# Patient Record
Sex: FEMALE | Race: WHITE | NOT HISPANIC OR LATINO | Employment: OTHER | URBAN - METROPOLITAN AREA
[De-identification: names, ages, dates, MRNs, and addresses within clinical notes are randomized per-mention and may not be internally consistent; named-entity substitution may affect disease eponyms.]

---

## 2017-07-18 ENCOUNTER — OFFICE VISIT (OUTPATIENT)
Dept: FAMILY MEDICINE | Facility: CLINIC | Age: 77
End: 2017-07-18

## 2017-07-18 VITALS
WEIGHT: 139 LBS | HEIGHT: 66 IN | HEART RATE: 70 BPM | DIASTOLIC BLOOD PRESSURE: 72 MMHG | RESPIRATION RATE: 16 BRPM | TEMPERATURE: 98.3 F | BODY MASS INDEX: 22.34 KG/M2 | OXYGEN SATURATION: 97 % | SYSTOLIC BLOOD PRESSURE: 119 MMHG

## 2017-07-18 DIAGNOSIS — Z01.818 PREOPERATIVE EXAMINATION: Primary | ICD-10-CM

## 2017-07-18 DIAGNOSIS — N18.30 CHRONIC KIDNEY DISEASE, STAGE III (MODERATE) (H): ICD-10-CM

## 2017-07-18 DIAGNOSIS — G47.30 SLEEP APNEA, UNSPECIFIED TYPE: ICD-10-CM

## 2017-07-18 LAB
ALBUMIN SERPL-MCNC: 4.2 G/DL (ref 3.4–5)
ALP SERPL-CCNC: 90 U/L (ref 40–150)
ALT SERPL W P-5'-P-CCNC: 26 U/L (ref 0–50)
ANION GAP SERPL CALCULATED.3IONS-SCNC: 6 MMOL/L (ref 3–14)
AST SERPL W P-5'-P-CCNC: 15 U/L (ref 0–45)
BILIRUB SERPL-MCNC: 0.6 MG/DL (ref 0.2–1.3)
BUN SERPL-MCNC: 21 MG/DL (ref 7–30)
CALCIUM SERPL-MCNC: 10.1 MG/DL (ref 8.5–10.1)
CHLORIDE SERPL-SCNC: 110 MMOL/L (ref 94–109)
CO2 SERPL-SCNC: 26 MMOL/L (ref 20–32)
CREAT SERPL-MCNC: 1.23 MG/DL (ref 0.52–1.04)
GFR SERPL CREATININE-BSD FRML MDRD: 43 ML/MIN/1.7M2
GLUCOSE SERPL-MCNC: 98 MG/DL (ref 70–99)
POTASSIUM SERPL-SCNC: 5 MMOL/L (ref 3.4–5.3)
PROT SERPL-MCNC: 7.4 G/DL (ref 6.8–8.8)
SODIUM SERPL-SCNC: 142 MMOL/L (ref 133–144)

## 2017-07-18 RX ORDER — IBUPROFEN 200 MG
1 CAPSULE ORAL 2 TIMES DAILY
Qty: 60 TABLET | COMMUNITY
Start: 2017-07-18

## 2017-07-18 ASSESSMENT — PAIN SCALES - GENERAL: PAINLEVEL: NO PAIN (0)

## 2017-07-18 NOTE — NURSING NOTE
"    Chief Complaint   Patient presents with     Pre-Op Exam     noes     77 year old      Blood pressure 119/72, pulse 70, temperature 98.3  F (36.8  C), temperature source Oral, resp. rate 16, height 5' 5.55\" (166.5 cm), weight 139 lb (63 kg), last menstrual period 01/01/1985, SpO2 97 %. Body mass index is 22.74 kg/(m^2).    Wt Readings from Last 2 Encounters:   07/18/17 139 lb (63 kg)   10/09/15 148 lb (67.1 kg)     BP Readings from Last 3 Encounters:   07/18/17 119/72   10/09/15 127/72   11/05/14 146/83       Patient Active Problem List   Diagnosis     History of pulmonary embolism     Concussion     Dysthymia     History of colonic polyps     Lymphedema     Migraine     Pancreatic abnormality     Sleep apnea     Esophageal reflux       Current Outpatient Prescriptions   Medication Sig Dispense Refill     order for DME Equipment being ordered: Oxygen                                            Portable oxygen tank                                            Deliver 2-3 liters via nasal cannula 1 Units prn     Calcium Carbonate-Vitamin D (CALTRATE 600+D PO)        traZODone (DESYREL) 50 MG tablet Take by mouth At Bedtime       venlafaxine (EFFEXOR-ER) 150 MG TB24 Take 150 mg by mouth daily (with breakfast)       simvastatin (ZOCOR) 20 MG tablet Take 1 tablet (20 mg) by mouth At Bedtime 90 tablet 3       Social History   Substance Use Topics     Smoking status: Former Smoker     Quit date: 1/1/1982     Smokeless tobacco: Not on file     Alcohol use No         Health Maintenance Due   Topic Date Due     ADVANCE DIRECTIVE PLANNING Q5 YRS  03/05/1960     LIPID SCREEN Q5 YR FEMALE (SYSTEM ASSIGNED)  03/05/1987     COLON CANCER SCREEN (SYSTEM ASSIGNED)  03/05/1992     FALL RISK ASSESSMENT  03/05/2007     DEXA SCAN SCREENING (SYSTEM ASSIGNED)  03/05/2007       ERUM OATES CMA  July 18, 2017 11:14 AM    "

## 2017-07-18 NOTE — PROGRESS NOTES
[facility]  [address] [place] [address], [address]  [facility] [address]  Phone: [phone]  Fax: [phone]    PRE-OP EVALUATION:    Today's date: 2017    Aretha Kiran (: 1940) presents for pre-operative evaluation assessment as requested by Dr. Ron Qiu III.  She requires evaluation and anesthesia risk assessment prior to undergoing surgery/procedure for treatment of injury to her nose after facial injury from 2016, when she fell down some stairs. Proposed procedure: Reconstruction of her nose, Septoplasty and scar revision, costal cartilage harvest.     Date of Surgery/ Procedure: 17  Time of Surgery/ Procedure: unknown  Hospital/Surgical Facility: [facility]  Fax number for surgical facility: [phone]  Primary Physician: Shelby Moreau MD  Internal Medicine/Pediatrics  Type of Anesthesia Anticipated: General  History of anesthesia complications: NONE  History of  abnormal bleeding: NONE   History of blood transfusions: NO  Patient has a Health Care Directive or Living Will:  NO    PREOP QUESTIONNAIRE  ====================  1- NO - Do you ever have any pain or discomfort in your chest?  2- NO - Have you ever had a severe pain across the front of your chest lasting for half an hour or more?  3- YES- Do you have swelling in your feet or ankles at times?  both legs, chronic, no acute changes, no orthopena/PND  4- NO - Are you troubled by shortness of breath when: walking on the level/ up a slight hill/ at night?  5- NO - Does your chest ever sound wheezy or whistling?  6- NO - Do you currently have a cold, bronchitis or other respiratory infection?  7- NO - Have you had a cold, bronchitis or other respiratory infection within the last 2 weeks?  8- NO - Do you usually have a cough?  9- NO - Do you sometimes get pains in the calves of your legs when you walk?  10-YES - Do you or anyone in your family have previous history of blood clots? Hx of blood clot while on  OCP  11-NO - Do you or does anyone in your family have serious bleeding problem such as prolonged bleeding following surgeries or cuts?  12-NO - Have you ever had problems with anemia or been told to take iron pills?  13-NO - Have you had any abnormal blood loss such as black, tarry or bloody stools, or abnormal vaginal bleeding?  14-NO - Have you or any of your relatives ever had problems with anesthesia?  15-YES Do you snore or stop breathing at night? Hx of Sleep apnea, uses CPAP  16-NO - Do you have any prosthetic heart valves or joints?  17-NO - Is there any chance that you may be pregnant?    HPI:    Isabell Franco is a 78 yo woman who suffered a severe fall down stairs on 4/19/16, resulting in multiple facial fractures and nasal injury. She had surgery to repair an orbital fracture 5/2016. She had subsequent strabismus surgery and reports her vision is now stable. Per patient report she suffered a cervical fracture (C6) , concussion and nasal injury. She reports she is to undergo reconstructive surgery on her nose with grafting of a rib in August.  She did not bring in paperwork from [place] regarding details of the surgery but will have information faxed to us. She denies loss of smell or taste.     Joan has no history of heart dx,lung dx, hypertension or DM. Nonsmoker since 1982. No family history of bleeding or clotting disorders. She reports she has a hx of pulmonary embolism in 2014 when she was on HRT and was in a sedentary state. She was anticoagulated x 3 months and has not had subsequent episode. No personal or family history of intolerance to anesthesia. She has not had any previous complications with surgeries.     Sleep apnea  She uses a CPAP machine and recently got a new facial mask that will not place any pressure on her nose. I have recommended she bring her CPAP machine to the surgery. She denies current cough, shortness or breath or wheezing.       Patient Active Problem List    Diagnosis Date  Noted     Esophageal reflux 12/06/2014     Priority: Medium     Dysthymia 12/01/2014     Priority: Medium     History of colonic polyps 12/01/2014     Priority: Medium     Last colonoscopy with removal of 3 polyps. Due 5/2015 for next endoscopy. Last one done at [facility].            Lymphedema 12/01/2014     Priority: Medium     Followed at [facility].  Wears support hose, uses massage.        Migraine 12/01/2014     Priority: Medium     Follows with Dr. Katie Nathan.        Pancreatic abnormality 12/01/2014     Priority: Medium     Followed for pancreatic masses x 3 (followed by [facility])       Sleep apnea 12/01/2014     Priority: Medium     Wears CPAP mask       History of pulmonary embolism 05/01/2014     Priority: Medium     HRT is stopped       Concussion 01/01/2014     Priority: Medium        Past Surgical History:   Procedure Laterality Date     CHOLECYSTECTOMY       ORBITOTOMY  03/2017     Current Outpatient Prescriptions   Medication Sig Dispense Refill     calcium 600 MG tablet Take 1 tablet (600 mg) by mouth 2 times daily 60 tablet      traZODone (DESYREL) 50 MG tablet Take by mouth At Bedtime       venlafaxine (EFFEXOR-ER) 150 MG TB24 Take 150 mg by mouth daily (with breakfast)       simvastatin (ZOCOR) 20 MG tablet Take 1 tablet (20 mg) by mouth At Bedtime 90 tablet 3     order for DME Equipment being ordered: Oxygen                                            Portable oxygen tank                                            Deliver 2-3 liters via nasal cannula 1 Units prn     OTC products: None, except as noted above    Allergies   Allergen Reactions     Codeine Nausea and Vomiting     Zoster Vaccine Live      redness, warm, itchy, hard lump, and soreness x 1 1/2 weeks.        Latex Allergy: NO    Social History   Substance Use Topics     Smoking status: Former Smoker     Quit date: 1/1/1982     Smokeless tobacco: Not on file     Alcohol use No     History   Drug Use No       REVIEW OF SYSTEMS:   Constitutional,  "HEENT, cardiovascular, pulmonary, gi and gu systems are negative, except as otherwise noted.    EXAM:   /72 (BP Location: Right arm, Patient Position: Sitting, Cuff Size: Adult Regular)  Pulse 70  Temp 98.3  F (36.8  C) (Oral)  Resp 16  Ht 5' 5.55\" (166.5 cm)  Wt 139 lb (63 kg)  LMP 01/01/1985  SpO2 97%  BMI 22.74 kg/m2  GENERAL APPEARANCE: Alert and no distress  HENT: ear canals and TM's normal, some disfiguration of her nose. Mouth without ulcers or lesions  RESP: lungs clear to auscultation - no rales, rhonchi or wheezes  CV: regular rate and rhythm, normal S1 S2,  no murmur, no carotid bruits  ABDOMEN: soft, nontender, no HSM or masses and bowel sounds normal  NEURO: Normal strength and tone, sensory exam grossly normal, mentation intact and speech normal  Ext: +1 pitting edema of right lower extremity, slightly worse on the left leg (chronic edema and asymmetry per pt), no redness or pain    DIAGNOSTICS:    Preop Testing   Results for orders placed or performed in visit on 07/18/17   Comprehensive metabolic panel   Result Value Ref Range    Sodium 142 133 - 144 mmol/L    Potassium 5.0 3.4 - 5.3 mmol/L    Chloride 110 (H) 94 - 109 mmol/L    Carbon Dioxide 26 20 - 32 mmol/L    Anion Gap 6 3 - 14 mmol/L    Glucose 98 70 - 99 mg/dL    Urea Nitrogen 21 7 - 30 mg/dL    Creatinine 1.23 (H) 0.52 - 1.04 mg/dL    GFR Estimate 43 (L) >60 mL/min/1.7m2    GFR Estimate If Black 51 (L) >60 mL/min/1.7m2    Calcium 10.1 8.5 - 10.1 mg/dL    Bilirubin Total 0.6 0.2 - 1.3 mg/dL    Albumin 4.2 3.4 - 5.0 g/dL    Protein Total 7.4 6.8 - 8.8 g/dL    Alkaline Phosphatase 90 40 - 150 U/L    ALT 26 0 - 50 U/L    AST 15 0 - 45 U/L       IMPRESSION:   (Z01.818) Preoperative examination  (primary encounter diagnosis)  Comment: elective reconstructive of her nose with costal cartilage graft at [facility]  Plan: Comprehensive metabolic panel, CANCELED:         Comprehensive Metabolic Panel ([place])        No contraindication " to surgery  Take prescription medication on the morning of surgery with a sip of water. Recommend she take no herbal supplements or ASA products 10 days prior to her procedure.      (G47.30) Sleep apnea, unspecified type  Comment: she wears CPAP  Plan: instructed her to bring her CPAP machine to hospital when she goes for surgery    (N18.3) Chronic kidney disease, stage III (moderate)  Comment: last Cr 1.27 with GFR 41 when checked in 2014.   Plan: would be helpful to have old records to know baseline kidney function. Await comprehensive panel. Renal dose any medications    The proposed surgical procedure is considered LOW risk.    For above listed surgery and anesthesia:   Patient is at Moderate risk for surgery/procedure and perioperative/procedure complications.    RECOMMENDATIONS:     --Approval given to proceed with proposed procedure, without further diagnostic evaluation.      Signed Electronically by: Shelby Moreau MD    Copy of this evaluation report is provided to requesting physician.      Preop Guidelines

## 2017-07-18 NOTE — LETTER
[facility]  [address], [address]  [facility] [address]  Phone: [phone]  Fax: [phone]       Date: July 24, 2017      Aretha Qiana  [address] [place] [id number]        Dear Aretha,    We are writing to inform you of your test results.    Resulted Orders   Comprehensive metabolic panel   Result Value Ref Range    Sodium 142 133 - 144 mmol/L    Potassium 5.0 3.4 - 5.3 mmol/L    Chloride 110 (H) 94 - 109 mmol/L    Carbon Dioxide 26 20 - 32 mmol/L    Anion Gap 6 3 - 14 mmol/L    Glucose 98 70 - 99 mg/dL    Urea Nitrogen 21 7 - 30 mg/dL    Creatinine 1.23 (H) 0.52 - 1.04 mg/dL    GFR Estimate 43 (L) >60 mL/min/1.7m2      Comment:      Non  GFR Calc    GFR Estimate If Black 51 (L) >60 mL/min/1.7m2      Comment:       GFR Calc    Calcium 10.1 8.5 - 10.1 mg/dL    Bilirubin Total 0.6 0.2 - 1.3 mg/dL    Albumin 4.2 3.4 - 5.0 g/dL    Protein Total 7.4 6.8 - 8.8 g/dL    Alkaline Phosphatase 90 40 - 150 U/L    ALT 26 0 - 50 U/L    AST 15 0 - 45 U/L

## 2017-07-18 NOTE — MR AVS SNAPSHOT
"              After Visit Summary   7/18/2017    Aretha Kiran    MRN: [id number]           Patient Information     Date Of Birth          1940        Visit Information        Provider Department      7/18/2017 10:40 AM Shelby Moreau MD [facility]        Today's Diagnoses     Preoperative examination    -  1    Sleep apnea, unspecified type        Chronic kidney disease, stage III (moderate)           Follow-ups after your visit        Who to contact     Please call your clinic at [phone] to:    Ask questions about your health    Make or cancel appointments    Discuss your medicines    Learn about your test results    Speak to your doctor   If you have compliments or concerns about an experience at your clinic, or if you wish to file a complaint, please contact [facility] Patient Relations at [phone] or email us at [email or web address]         Additional Information About Your Visit        Care EveryWhere ID     This is your Care EveryWhere ID. This could be used by other organizations to access your [facility] medical records  [id number]        Your Vitals Were     Pulse Temperature Respirations Height Last Period Pulse Oximetry    70 98.3  F (36.8  C) (Oral) 16 5' 5.55\" (166.5 cm) 01/01/1985 97%    BMI (Body Mass Index)                   22.74 kg/m2            Blood Pressure from Last 3 Encounters:   07/18/17 119/72   10/09/15 127/72   11/05/14 146/83    Weight from Last 3 Encounters:   07/18/17 139 lb (63 kg)   10/09/15 148 lb (67.1 kg)   11/05/14 150 lb (68 kg)              We Performed the Following     Comprehensive metabolic panel          Today's Medication Changes          These changes are accurate as of: 7/18/17  5:04 PM.  If you have any questions, ask your nurse or doctor.               Stop taking these medicines if you haven't already. Please contact your care team if you have questions.     aspirin 81 MG chewable tablet   Stopped by:  " Shelby Moreau MD           CENTRUM SILVER per tablet   Stopped by:  Shelby Moreau MD                    Primary Care Provider Office Phone # Fax #    Shelby Moreau [phone]       [address] [address]        Equal Access to Services     [facility] CenterNEREIDA : Hadii aad ku hadasho Soomaali, waaxda luqadaha, qaybta kaalmada adeegyada, waxay italiain hayaan adelily filomenarene lagiselle . So [facility] [phone].    ATENCIÓN: Si habla español, tiene a mcdermott disposición servicios gratuitos de asistencia lingüística. Michael al [phone].    We comply with applicable federal civil rights laws and [place] laws. We do not discriminate on the basis of race, color, national origin, age, disability sex, sexual orientation or gender identity.            Thank you!     Thank you for choosing [facility]  for your care. Our goal is always to provide you with excellent care. Hearing back from our patients is one way we can continue to improve our services. Please take a few minutes to complete the written survey that you may receive in the mail after your visit with us. Thank you!             Your Updated Medication List - Protect others around you: Learn how to safely use, store and throw away your medicines at www.disposemymeds.org.          This list is accurate as of: 7/18/17  5:04 PM.  Always use your most recent med list.                   Brand Name Dispense Instructions for use Diagnosis    calcium 600 MG tablet     60 tablet    Take 1 tablet (600 mg) by mouth 2 times daily        order for DME     1 Units    Equipment being ordered: Oxygen                                           Portable oxygen tank                                           Deliver 2-3 liters via nasal cannula    Acute mountain sickness       simvastatin 20 MG tablet    ZOCOR    90 tablet    Take 1 tablet (20 mg) by mouth At Bedtime    Esophageal reflux       traZODone 50 MG tablet     DESYREL     Take by mouth At Bedtime        venlafaxine 150 MG Tb24 24 hr tablet    EFFEXOR-ER     Take 150 mg by mouth daily (with breakfast)

## 2017-10-25 ENCOUNTER — TRANSFERRED RECORDS (OUTPATIENT)
Dept: HEALTH INFORMATION MANAGEMENT | Facility: CLINIC | Age: 77
End: 2017-10-25

## 2018-05-07 ENCOUNTER — OFFICE VISIT (OUTPATIENT)
Dept: FAMILY MEDICINE | Facility: CLINIC | Age: 78
End: 2018-05-07
Payer: MEDICARE

## 2018-05-07 VITALS
HEIGHT: 66 IN | DIASTOLIC BLOOD PRESSURE: 82 MMHG | OXYGEN SATURATION: 99 % | BODY MASS INDEX: 22.06 KG/M2 | WEIGHT: 137.25 LBS | HEART RATE: 66 BPM | TEMPERATURE: 97.4 F | SYSTOLIC BLOOD PRESSURE: 148 MMHG

## 2018-05-07 DIAGNOSIS — J30.0 CHRONIC VASOMOTOR RHINITIS: Primary | ICD-10-CM

## 2018-05-07 DIAGNOSIS — J01.00 ACUTE NON-RECURRENT MAXILLARY SINUSITIS: ICD-10-CM

## 2018-05-07 RX ORDER — AMOXICILLIN 875 MG
875 TABLET ORAL 2 TIMES DAILY
Qty: 28 TABLET | Refills: 1 | Status: SHIPPED | OUTPATIENT
Start: 2018-05-07 | End: 2019-09-13

## 2018-05-07 RX ORDER — IPRATROPIUM BROMIDE 42 UG/1
2 SPRAY, METERED NASAL 3 TIMES DAILY
Qty: 1 BOX | Refills: 3 | Status: SHIPPED | OUTPATIENT
Start: 2018-05-07 | End: 2019-07-12

## 2018-05-07 ASSESSMENT — PAIN SCALES - GENERAL: PAINLEVEL: NO PAIN (0)

## 2018-05-07 NOTE — NURSING NOTE
"78 year old  Chief Complaint   Patient presents with     URI     Constant runny nose, denies sore throat, just finished a z pack antibiotic, rattling cough.        Blood pressure 148/82, pulse 66, temperature 97.4  F (36.3  C), temperature source Temporal, height 5' 5.55\" (166.5 cm), weight 137 lb 4 oz (62.3 kg), last menstrual period 01/01/1985, SpO2 99 %. Body mass index is 22.46 kg/(m^2).  Patient Active Problem List   Diagnosis     History of pulmonary embolism     Concussion     Dysthymia     History of colonic polyps     Lymphedema     Migraine     Pancreatic abnormality     Sleep apnea     Esophageal reflux       Wt Readings from Last 2 Encounters:   05/07/18 137 lb 4 oz (62.3 kg)   07/18/17 139 lb (63 kg)     BP Readings from Last 3 Encounters:   05/07/18 148/82   07/18/17 119/72   10/09/15 127/72         Current Outpatient Prescriptions   Medication     calcium 600 MG tablet     order for DME     simvastatin (ZOCOR) 20 MG tablet     traZODone (DESYREL) 50 MG tablet     venlafaxine (EFFEXOR-ER) 150 MG TB24     No current facility-administered medications for this visit.        Social History   Substance Use Topics     Smoking status: Former Smoker     Quit date: 1/1/1982     Smokeless tobacco: Never Used     Alcohol use No       Health Maintenance Due   Topic Date Due     LIPID SCREEN Q5 YR FEMALE (SYSTEM ASSIGNED)  03/05/1987     ADVANCE DIRECTIVE PLANNING Q5 YRS  03/05/1997     FALL RISK ASSESSMENT  03/05/2007     DEXA SCAN SCREENING (SYSTEM ASSIGNED)  03/05/2007       No results found for: JENNYFER Parks MA  May 7, 2018 11:44 AM    "

## 2018-05-07 NOTE — MR AVS SNAPSHOT
"              After Visit Summary   2018    Aretha Kiran    MRN: [id number]           Patient Information     Date Of Birth          1940        Visit Information        Provider Department      2018 11:20 AM John Olson MD [facility]        Today's Diagnoses     Acute non-recurrent maxillary sinusitis    -  1    Chronic vasomotor rhinitis           Follow-ups after your visit        Who to contact     Please call your clinic at [phone] to:    Ask questions about your health    Make or cancel appointments    Discuss your medicines    Learn about your test results    Speak to your doctor            Additional Information About Your Visit        MyChart Information     [employer] is an electronic gateway that provides easy, online access to your medical records. With [employer], you can request a clinic appointment, read your test results, renew a prescription or communicate with your care team.     To sign up for [employer] visit the website at [email or web address]   You will be asked to enter the access code listed below, as well as some personal information. Please follow the directions to create your username and password.     Your access code is: [email or web address]  Expires: 2018  1:57 PM     Your access code will  in 90 days. If you need help or a new code, please contact your [facility] or call [phone] for assistance.        Care EveryWhere ID     This is your Care EveryWhere ID. This could be used by other organizations to access your [facility] medical records  [id number]        Your Vitals Were     Pulse Temperature Height Last Period Pulse Oximetry BMI (Body Mass Index)    66 97.4  F (36.3  C) (Temporal) 5' 5.55\" (166.5 cm) 1985 99% 22.46 kg/m2       Blood Pressure from Last 3 Encounters:   18 148/82   17 119/72   10/09/15 127/72    Weight from Last 3 Encounters:   18 137 lb 4 oz (62.3 kg)   17 139 lb (63 kg) "   10/09/15 148 lb (67.1 kg)              Today, you had the following     No orders found for display         Today's Medication Changes          These changes are accurate as of 5/7/18  1:57 PM.  If you have any questions, ask your nurse or doctor.               Start taking these medicines.        Dose/Directions    amoxicillin 875 MG tablet   Commonly known as:  AMOXIL   Used for:  Acute non-recurrent maxillary sinusitis   Started by:  John Olson MD        Dose:  875 mg   Take 1 tablet (875 mg) by mouth 2 times daily   Quantity:  28 tablet   Refills:  1       ipratropium 0.06 % spray   Commonly known as:  ATROVENT   Used for:  Chronic vasomotor rhinitis   Started by:  John Olson MD        Dose:  2 spray   Spray 2 sprays into both nostrils 3 times daily   Quantity:  1 Box   Refills:  3            Where to get your medicines      These medications were sent to [facility] [address] [place] [address] AT [address] [address]    Hours:  24-hours Phone:  [phone]     amoxicillin 875 MG tablet    ipratropium 0.06 % spray                Primary Care Provider Office Phone # Fax #    Shelby Humaira Moreau [phone] [address] [place] [id number]        Equal Access to Services     [place] AH: Risa lopezo Sochristian, waaxda luqadaha, qaybta kaalmada adeegyada, brad rodriguez. So [facility] [phone].    ATENCIÓN: Si habla español, tiene a mcdermott disposición servicios gratuitos de asistencia lingüística. Llame al [phone].    We comply with applicable federal civil rights laws and [place] laws. We do not discriminate on the basis of race, color, national origin, age, disability, sex, sexual orientation, or gender identity.            Thank you!     Thank you for choosing [facility]  for your care. Our goal is always to provide you with excellent care. Hearing back from  our patients is one way we can continue to improve our services. Please take a few minutes to complete the written survey that you may receive in the mail after your visit with us. Thank you!             Your Updated Medication List - Protect others around you: Learn how to safely use, store and throw away your medicines at www.disposemymeds.org.          This list is accurate as of 5/7/18  1:57 PM.  Always use your most recent med list.                   Brand Name Dispense Instructions for use Diagnosis    amoxicillin 875 MG tablet    AMOXIL    28 tablet    Take 1 tablet (875 mg) by mouth 2 times daily    Acute non-recurrent maxillary sinusitis       calcium 600 MG tablet     60 tablet    Take 1 tablet (600 mg) by mouth 2 times daily        ipratropium 0.06 % spray    ATROVENT    1 Box    Spray 2 sprays into both nostrils 3 times daily    Chronic vasomotor rhinitis       order for DME     1 Units    Equipment being ordered: Oxygen                                           Portable oxygen tank                                           Deliver 2-3 liters via nasal cannula    Acute mountain sickness       simvastatin 20 MG tablet    ZOCOR    90 tablet    Take 1 tablet (20 mg) by mouth At Bedtime    Esophageal reflux       traZODone 50 MG tablet    DESYREL     Take by mouth At Bedtime        venlafaxine 150 MG Tb24 24 hr tablet    EFFEXOR-ER     Take 150 mg by mouth daily (with breakfast)

## 2018-10-18 ENCOUNTER — TRANSFERRED RECORDS (OUTPATIENT)
Dept: HEALTH INFORMATION MANAGEMENT | Facility: CLINIC | Age: 78
End: 2018-10-18

## 2019-03-14 DIAGNOSIS — Z01.89 ENCOUNTER FOR LABORATORY TEST: Primary | ICD-10-CM

## 2019-03-14 NOTE — PROGRESS NOTES
Performed venipuncture on the [facility]'s patient.  Lab specimen will get send to [place] to process.    Talita Mobley CMA  March 14, 2019 10:29 AM

## 2019-07-11 DIAGNOSIS — J30.0 CHRONIC VASOMOTOR RHINITIS: ICD-10-CM

## 2019-07-11 NOTE — TELEPHONE ENCOUNTER
Last time prescribed: 5/7/18 , 1 box x 3 refills  Last office visit: 5/7/18  Next appointment: No future appointments

## 2019-07-12 RX ORDER — IPRATROPIUM BROMIDE 42 UG/1
2 SPRAY, METERED NASAL 3 TIMES DAILY
Qty: 1 BOX | Refills: 0 | Status: SHIPPED | OUTPATIENT
Start: 2019-07-12 | End: 2019-09-13

## 2019-07-12 NOTE — TELEPHONE ENCOUNTER
Routing refill request to provider for review/approval because:  Drug not on the [facility] refill protocol   Patient needs to be seen because it has been more than 1 year since last office visit.    Marci Sosa RN  July 12, 2019 11:58 AM

## 2019-09-03 ENCOUNTER — TELEPHONE (OUTPATIENT)
Dept: FAMILY MEDICINE | Facility: CLINIC | Age: 79
End: 2019-09-03

## 2019-09-03 NOTE — TELEPHONE ENCOUNTER
Form or Letter Request    Type or form/letter needing completion: Huseyin hernandez membership medical statement  Provider: Whitney  Has provider seen patient for office visit related to reason for form request? Does not know  Date form needed: Unknown  Once completed: Mail form to Name: marcelino Wilburn Address: [address]

## 2019-09-04 NOTE — TELEPHONE ENCOUNTER
Called pt , had to leave message. Told the  Pt she needs to make a 40 minute appt with [facility],  as it has been over a year since seen in clinic, and per form, must be seen within the last 12 months for this form to be filled out.     Form in red RN folder on desk.    Marci Sosa, RN,RN  September 4, 2019 3:55 PM

## 2019-09-13 ENCOUNTER — OFFICE VISIT (OUTPATIENT)
Dept: FAMILY MEDICINE | Facility: CLINIC | Age: 79
End: 2019-09-13
Payer: COMMERCIAL

## 2019-09-13 VITALS
BODY MASS INDEX: 25.2 KG/M2 | SYSTOLIC BLOOD PRESSURE: 126 MMHG | HEART RATE: 70 BPM | OXYGEN SATURATION: 94 % | HEIGHT: 65 IN | WEIGHT: 151.25 LBS | DIASTOLIC BLOOD PRESSURE: 70 MMHG | TEMPERATURE: 97.2 F

## 2019-09-13 DIAGNOSIS — J01.00 ACUTE NON-RECURRENT MAXILLARY SINUSITIS: ICD-10-CM

## 2019-09-13 DIAGNOSIS — J30.0 CHRONIC VASOMOTOR RHINITIS: ICD-10-CM

## 2019-09-13 DIAGNOSIS — R49.9 CHANGE IN VOICE: Primary | ICD-10-CM

## 2019-09-13 DIAGNOSIS — K21.9 GASTROESOPHAGEAL REFLUX DISEASE WITHOUT ESOPHAGITIS: ICD-10-CM

## 2019-09-13 RX ORDER — LEVOTHYROXINE SODIUM 50 UG/1
50 TABLET ORAL
COMMUNITY
Start: 2019-01-30 | End: 2024-08-21

## 2019-09-13 RX ORDER — IPRATROPIUM BROMIDE 42 UG/1
2 SPRAY, METERED NASAL 3 TIMES DAILY
Qty: 15 ML | Refills: 11 | Status: SHIPPED | OUTPATIENT
Start: 2019-09-13 | End: 2020-02-05

## 2019-09-13 ASSESSMENT — ANXIETY QUESTIONNAIRES
3. WORRYING TOO MUCH ABOUT DIFFERENT THINGS: NOT AT ALL
6. BECOMING EASILY ANNOYED OR IRRITABLE: NOT AT ALL
5. BEING SO RESTLESS THAT IT IS HARD TO SIT STILL: NOT AT ALL
IF YOU CHECKED OFF ANY PROBLEMS ON THIS QUESTIONNAIRE, HOW DIFFICULT HAVE THESE PROBLEMS MADE IT FOR YOU TO DO YOUR WORK, TAKE CARE OF THINGS AT HOME, OR GET ALONG WITH OTHER PEOPLE: NOT DIFFICULT AT ALL
1. FEELING NERVOUS, ANXIOUS, OR ON EDGE: NOT AT ALL
7. FEELING AFRAID AS IF SOMETHING AWFUL MIGHT HAPPEN: NOT AT ALL
GAD7 TOTAL SCORE: 0
2. NOT BEING ABLE TO STOP OR CONTROL WORRYING: NOT AT ALL

## 2019-09-13 ASSESSMENT — PATIENT HEALTH QUESTIONNAIRE - PHQ9
SUM OF ALL RESPONSES TO PHQ QUESTIONS 1-9: 3
5. POOR APPETITE OR OVEREATING: NOT AT ALL

## 2019-09-13 ASSESSMENT — MIFFLIN-ST. JEOR: SCORE: 1171.33

## 2019-09-13 NOTE — PROGRESS NOTES
Aretha Kiran is a 79 year old female here for the following issues:     Chronic vasomotor rhinitis  Joan has history of chronic rhinitis. She is requesting refill for her Atrovent nasal spray.  She is using this PRN, to alleviate itchy and runny nose, which are a result of a nasal injury requiring surgery, in 4/2016.      Voice Changes  Joan is concerned that her voice has been changing, after cleaning out an old barn in early June.  She has also been clearing her throat more often than normal.  No wheezing.  No changes in acid reflux.  No fever or SOB. She follows with an ENT doctor at [facility] and prefers to follow up there.     Bilateral Lymphedema  Aretha has a long standing hx of lymphedema in both of her legs, L>R.  She wears compression socks, and recently received a pneumatic pump.  She is awaiting for her  to come show her how to use the pump, which she believes will be occurring tomorrow. She is currently wearing compression hose. Left ankle is larger than right as a baseline. No hx of injury to the left leg.    Hypothyroidism  Aretha follows with [facility] for hypothyroidism, and recently had levothyroxine 50 mcg added. She will follow up there for medication refills.     HTN  She follows with [facility] for her hypertension. She takes losartan 25mg daily, along with HCTZ 25mg daily.  No chest pain. Blood pressures are astable.    Forms  She has brought forms to be filled out as well, to continue services with [facility]. This is an insurance plan for medical plane services, if needed to ensure travel home. She has not been hospitalized in the past year. Her health conditions are stable.       Patient Active Problem List   Diagnosis     History of pulmonary embolism     Concussion     Dysthymia     History of colonic polyps     Lymphedema     Migraine     Pancreatic abnormality     Sleep apnea     Esophageal reflux       Current Outpatient Medications   Medication Sig Dispense Refill     amoxicillin (AMOXIL)  "500 MG capsule Take 4 capsules by mouth as needed Take 4 capsules one hour prior to dental visit       aspirin 81 MG EC tablet Take 81 mg by mouth daily       calcium 600 MG tablet Take 1 tablet (600 mg) by mouth 2 times daily 60 tablet      hydrochlorothiazide (HYDRODIURIL) 25 MG tablet Take 25 mg by mouth daily       ipratropium (ATROVENT) 0.06 % nasal spray Spray 2 sprays into both nostrils 3 times daily 15 mL 11     levothyroxine (SYNTHROID/LEVOTHROID) 50 MCG tablet Take 50 mcg by mouth       losartan (COZAAR) 25 MG tablet Take 25 mg by mouth daily       Multiple Vitamins-Minerals (PRESERVISION AREDS 2 PO) Take 1 capsule by mouth daily       simvastatin (ZOCOR) 20 MG tablet Take 1 tablet (20 mg) by mouth At Bedtime 90 tablet 3     traZODone (DESYREL) 100 MG tablet Take 1 tablet by mouth nightly as needed Take 0.5 to 1 po q hs       venlafaxine (EFFEXOR-ER) 150 MG TB24 Take 150 mg by mouth daily (with breakfast)         Allergies   Allergen Reactions     Codeine Nausea and Vomiting     Zoster Vaccine Live      redness, warm, itchy, hard lump, and soreness x 1 1/2 weeks.          EXAM  /70 (BP Location: Left arm, Patient Position: Sitting, Cuff Size: Adult Regular)   Pulse 70   Temp 97.2  F (36.2  C) (Oral)   Ht 1.65 m (5' 4.96\")   Wt 68.6 kg (151 lb 4 oz)   LMP 01/01/1985   SpO2 94%   BMI 25.20 kg/m    Gen: Alert, pleasant, NAD  COR: S1,S2, no murmur  Lungs: CTA bilaterally, no rhonchi, wheezes or rales  Ext: + no pretibial and ankle edema,  Pulses present billaterally      Assessment:  (J30.0) Chronic vasomotor rhinitis  Comment: she needs refills on atrovent nasal spray  Plan: ipratropium (ATROVENT) 0.06 % nasal spray        Refilled medication    (K21.9) Esophageal reflux  Comment: longstanding problem  Plan: continue omeprazole as directed..     (R49.9) Change in voice  (primary encounter diagnosis)  Comment: gravely voice, DDX if acid reflux, vocal cord nodules, other  Plan: recommend she follow " up with ENT at [facility] where she prefers      Completed paperwork today for Joan today, indicating she was in good health and could travel for now.     Total visit time today 25 minutes.  More than 50% of the time was spent in counseling and coordination of care        Shelby Moreau MD  Internal Medicine/Pediatrics      I, Pierre Abel, am serving as a scribe to document services personally performed by Dr. Shelby Moreau, based on data collection and the provider's statements to me. Dr. Moreau has reviewed, edited, and approved the above note.

## 2019-09-13 NOTE — NURSING NOTE
"79 year old  Chief Complaint   Patient presents with     Recheck Medication     refill  on medication        Blood pressure 126/70, pulse 70, temperature 97.2  F (36.2  C), temperature source Oral, height 1.65 m (5' 4.96\"), weight 68.6 kg (151 lb 4 oz), last menstrual period 1985, SpO2 94 %. Body mass index is 25.2 kg/m .  Patient Active Problem List   Diagnosis     History of pulmonary embolism     Concussion     Dysthymia     History of colonic polyps     Lymphedema     Migraine     Pancreatic abnormality     Sleep apnea     Esophageal reflux       Wt Readings from Last 2 Encounters:   19 68.6 kg (151 lb 4 oz)   18 62.3 kg (137 lb 4 oz)     BP Readings from Last 3 Encounters:   19 126/70   18 148/82   17 119/72         Current Outpatient Medications   Medication     calcium 600 MG tablet     ipratropium (ATROVENT) 0.06 % nasal spray     levothyroxine (SYNTHROID/LEVOTHROID) 50 MCG tablet     order for DME     simvastatin (ZOCOR) 20 MG tablet     traZODone (DESYREL) 50 MG tablet     venlafaxine (EFFEXOR-ER) 150 MG TB24     No current facility-administered medications for this visit.        Social History     Tobacco Use     Smoking status: Former Smoker     Last attempt to quit: 1982     Years since quittin.7     Smokeless tobacco: Never Used   Substance Use Topics     Alcohol use: No     Drug use: No       Health Maintenance Due   Topic Date Due     DEXA  1940     ADVANCE CARE PLANNING  1940     DEPRESSION ACTION PLAN  1940     PHQ-9  1940     LIPID  1987     MEDICARE ANNUAL WELLNESS VISIT  2007     FALL RISK ASSESSMENT  2007     INFLUENZA VACCINE (1) 2019       No results found for: PAP      2019 4:03 PM    "

## 2019-09-14 RX ORDER — AMOXICILLIN 500 MG/1
4 CAPSULE ORAL PRN
COMMUNITY
Start: 2018-08-20

## 2019-09-14 RX ORDER — ASPIRIN 81 MG/1
81 TABLET ORAL DAILY
COMMUNITY

## 2019-09-14 RX ORDER — HYDROCHLOROTHIAZIDE 25 MG/1
25 TABLET ORAL DAILY
COMMUNITY
Start: 2018-07-10 | End: 2024-08-21 | Stop reason: DRUGHIGH

## 2019-09-14 RX ORDER — LOSARTAN POTASSIUM 25 MG/1
25 TABLET ORAL DAILY
COMMUNITY
Start: 2019-01-30 | End: 2024-08-21 | Stop reason: DRUGHIGH

## 2019-09-14 RX ORDER — TRAZODONE HYDROCHLORIDE 100 MG/1
1 TABLET ORAL
COMMUNITY
Start: 2013-04-30 | End: 2024-08-21

## 2019-09-14 ASSESSMENT — ANXIETY QUESTIONNAIRES: GAD7 TOTAL SCORE: 0

## 2019-10-08 ENCOUNTER — TELEPHONE (OUTPATIENT)
Dept: FAMILY MEDICINE | Facility: CLINIC | Age: 79
End: 2019-10-08

## 2019-10-08 NOTE — TELEPHONE ENCOUNTER
[facility] Call Center    Phone Message    May a detailed message be left on voicemail: yes    Reason for Call: Symptoms or Concerns     If patient has red-flag symptoms, warm transfer to triage line    Current symptom or concern: Right hand soreness, swelling    Symptoms have been present for:  1 week(s)    Has patient previously been seen for this? No    By: Dr. Moreau    Date: Last seen 9/13/2019    Are there any new or worsening symptoms? Yes: Pt notes she has had soreness and swelling in her right hand, particularly her ring finger. She notes it seems stiff and is concerning to her. She notes this happened once about 10 yrs ago, and she worries it may be arthritis. Please call to discuss.    Other: Pt requests if a referral for dermatology can be placed. She states she is in need of a full skin check. She is wanting to see Dr. Marichuy Ball at [facility] in [place]. Their fax number is [phone].    Action Taken: Message routed to:  [facility]: [facility] [place] nurse

## 2019-10-08 NOTE — TELEPHONE ENCOUNTER
Called patient, LVM - patient should schedule appointment for concern for arthritis. Please call and schedule at [phone].    Emily Quiles RN  10/08/19  3:41 PM

## 2019-10-10 ENCOUNTER — OFFICE VISIT (OUTPATIENT)
Dept: FAMILY MEDICINE | Facility: CLINIC | Age: 79
End: 2019-10-10
Payer: MEDICARE

## 2019-10-10 VITALS
HEART RATE: 64 BPM | HEIGHT: 65 IN | WEIGHT: 149.5 LBS | OXYGEN SATURATION: 96 % | TEMPERATURE: 97.6 F | DIASTOLIC BLOOD PRESSURE: 59 MMHG | SYSTOLIC BLOOD PRESSURE: 100 MMHG | BODY MASS INDEX: 24.91 KG/M2

## 2019-10-10 DIAGNOSIS — Z23 NEEDS FLU SHOT: ICD-10-CM

## 2019-10-10 DIAGNOSIS — M79.644 PAIN OF FINGER OF RIGHT HAND: Primary | ICD-10-CM

## 2019-10-10 ASSESSMENT — MIFFLIN-ST. JEOR: SCORE: 1163.39

## 2019-10-10 ASSESSMENT — PAIN SCALES - GENERAL: PAINLEVEL: MODERATE PAIN (4)

## 2019-10-10 NOTE — NURSING NOTE
"79 year old  Chief Complaint   Patient presents with     Musculoskeletal Problem     right hand pain/ swelling in the knuckles x 2 wks        Blood pressure 100/59, pulse 64, temperature 97.6  F (36.4  C), temperature source Oral, height 1.65 m (5' 4.96\"), weight 67.8 kg (149 lb 8 oz), last menstrual period 1985, SpO2 96 %. Body mass index is 24.91 kg/m .  Patient Active Problem List   Diagnosis     History of pulmonary embolism     Concussion     Dysthymia     History of colonic polyps     Lymphedema     Migraine     Pancreatic abnormality     Sleep apnea     Esophageal reflux       Wt Readings from Last 2 Encounters:   10/10/19 67.8 kg (149 lb 8 oz)   19 68.6 kg (151 lb 4 oz)     BP Readings from Last 3 Encounters:   10/10/19 100/59   19 126/70   18 148/82         Current Outpatient Medications   Medication     aspirin 81 MG EC tablet     calcium 600 MG tablet     hydrochlorothiazide (HYDRODIURIL) 25 MG tablet     levothyroxine (SYNTHROID/LEVOTHROID) 50 MCG tablet     losartan (COZAAR) 25 MG tablet     simvastatin (ZOCOR) 20 MG tablet     traZODone (DESYREL) 100 MG tablet     venlafaxine (EFFEXOR-ER) 150 MG TB24     amoxicillin (AMOXIL) 500 MG capsule     ipratropium (ATROVENT) 0.06 % nasal spray     No current facility-administered medications for this visit.        Social History     Tobacco Use     Smoking status: Former Smoker     Last attempt to quit: 1982     Years since quittin.7     Smokeless tobacco: Never Used   Substance Use Topics     Alcohol use: No     Drug use: No       Health Maintenance Due   Topic Date Due     DEXA  1940     ADVANCE CARE PLANNING  1942     DEPRESSION ACTION PLAN  1942     LIPID  1987     MEDICARE ANNUAL WELLNESS VISIT  2007     INFLUENZA VACCINE (1) 2019       No results found for: PAP      October 10, 2019 8:43 AM  Prior to immunization administration, verified patients identity using patient s name and date of " birth. Please see Immunization Activity for additional information.     Screening Questionnaire for Adult Immunization    Are you sick today?   No   Do you have allergies to medications, food, a vaccine component or latex?   No   Have you ever had a serious reaction after receiving a vaccination?   No   Do you have a long-term health problem with heart disease, lung disease, asthma, kidney disease, metabolic disease (e.g. diabetes), anemia, or other blood disorder?   No   Do you have cancer, leukemia, HIV/AIDS, or any other immune system problem?   No   In the past 3 months, have you taken medications that affect  your immune system, such as prednisone, other steroids, or anticancer drugs; drugs for the treatment of rheumatoid arthritis, Crohn s disease, or psoriasis; or have you had radiation treatments?   No   Have you had a seizure, or a brain or other nervous system problem?   No   During the past year, have you received a transfusion of blood or blood     products, or been given immune (gamma) globulin or antiviral drug?   No   For women: Are you pregnant or is there a chance you could become        pregnant during the next month?   No   Have you received any vaccinations in the past 4 weeks?   No     Immunization questionnaire answers were all negative.        Per orders of Dr. Matias , injection of Flu  given by [facility] . Patient instructed to remain in clinic for 15 minutes afterwards, and to report any adverse reaction to me immediately.       Screening performed by [facility] on 10/10/2019 at 8:43 AM.

## 2019-10-10 NOTE — PROGRESS NOTES
SUBJECTIVE:   Aretha Kiran is a 79 year old female who presents to clinic today to discuss the following problem(s).    R finger pain  - PIP joint of 4th and 5th fingers  - been going on, worsening for the past 2 weeks or so  - she does report she gets lymphedema in her legs for which she puts on compression sleeves which can be very difficult to do put on. She says she has special gloves to help her put the leg sleeves on  - doesn't really have these symptoms on her left hand  - no reported history of rheumatoid arthritis  - had never had pain in her hand like this before  - no real associated warmth  - not aware of symptoms getting better or worse with use or at different times through    ROS:   CONSTITUTIONAL: NEGATIVE for chills, fatigue  RESP: NEGATIVE for cough, hemoptysis, SOB/dyspnea and wheezing  CV: NEGATIVE for chest pain/chest pressure, dyspnea on exertion  GI: NEGATIVE for abdominal pain, diarrhea, dysphagia and nausea  MUSCULOSKELETAL: R finger pain as detailed above  INTEGUMENTARY/SKIN: NEGATIVE for new lesions and pruritis  PSYCHIATRIC: NEGATIVE    Today's PHQ-2:  PHQ-2 (  [employer]) 2018   Q1: Little interest or pleasure in doing things 0   Q2: Feeling down, depressed or hopeless 0   PHQ-2 Score 0       Past Medical History:   Diagnosis Date     Orbital floor fracture (H)      Past Surgical History:   Procedure Laterality Date     CHOLECYSTECTOMY       ORBITOTOMY  2017     Family History   Problem Relation Age of Onset     Dementia Mother      Heart Disease Father      Cerebrovascular Disease Father         TIAs     Heart Disease Paternal Grandfather      Breast Cancer Maternal Aunt         x2     Cancer Paternal Aunt         uterine     Social History     Tobacco Use     Smoking status: Former Smoker     Last attempt to quit: 1982     Years since quittin.7     Smokeless tobacco: Never Used   Substance Use Topics     Alcohol use: No     Drug use: No     Social History     Patient  "does not qualify to have social determinant information on file (likely too young).   Social History Narrative     Not on file       Current Outpatient Medications   Medication     aspirin 81 MG EC tablet     calcium 600 MG tablet     hydrochlorothiazide (HYDRODIURIL) 25 MG tablet     levothyroxine (SYNTHROID/LEVOTHROID) 50 MCG tablet     losartan (COZAAR) 25 MG tablet     simvastatin (ZOCOR) 20 MG tablet     traZODone (DESYREL) 100 MG tablet     venlafaxine (EFFEXOR-ER) 150 MG TB24     amoxicillin (AMOXIL) 500 MG capsule     ipratropium (ATROVENT) 0.06 % nasal spray     No current facility-administered medications for this visit.      I have reviewed the patient's past medical, surgical, family, and social history.     OBJECTIVE:   /59 (BP Location: Left arm, Patient Position: Sitting, Cuff Size: Adult Regular)   Pulse 64   Temp 97.6  F (36.4  C) (Oral)   Ht 1.65 m (5' 4.96\")   Wt 67.8 kg (149 lb 8 oz)   LMP 01/01/1985   SpO2 96%   BMI 24.91 kg/m      Constitutional: well-appearing, appears stated age  Eyes: conjunctivae without erythema, sclera anicteric.   Cardiac: regular rate and rhythm, normal S1/S2, no murmur/rubs/gallops  Respiratory: lungs clear to auscultation bilaterally, normal work of breathing, no wheezes/crackles  MSK:   - moderate swelling most pronounced in PIP joint of 4th finger of R hand, no discoloration,   - no unusual warmth, tender to palpation between MCP and PIP  - decreased flexion most notably at DIP joint  - decreased strength with antalgia  - brisk capillary refill  - light touch sensation intact throughout  Skin: no rashes, lesions, or wounds  Psych: affect is full and appropriate, speech is fluent and non-pressured    ASSESSMENT AND PLAN:     Aretha was seen today for musculoskeletal problem.    Diagnoses and all orders for this visit:    Pain of finger of right hand  -     [facility] REFERRAL; Future    Needs flu shot  -     C RIV4 (FLUBLOK) VACCINE " RECOMBINANT DNA PRSRV ANTIBIO FREE, IM  -     ADMIN INFLUENZA VIRUS VACCINE    Given presentation I had some initial concern for rheumatoid issues, however history does not appear to support this. Also, inflammation does appear to be specific to the 4th and 5th fingers of the R hand. Given patient's report of her struggle to get her compression sleeves on I suspect this may be acute strain. Encouraged patient to try a short course of high dose ibuprofen for the next week in addition to hand therapy as noted above. Patient states she has extensive travel plans for the next few months, but will try her best to incorporate hand therapy.    Further advice on finger sprain care and reasons to seek acute follow up discussed as noted in patient instructions.         Silverio Matias MD  [facility] [place]  10/10/2019, 8:46 AM

## 2019-10-10 NOTE — PATIENT INSTRUCTIONS
Patient Education     Finger Sprain  A sprain is a stretching or tearing of the ligaments that hold a joint together. There are no broken bones. Sprains take 3 to 6 weeks or more to heal.  A sprained finger may be treated with a splint or buddy tape. This is when you tape the injured finger to the one next to it for support. Minor sprains may require no additional support.  Home care    Keep your hand elevated to reduce pain and swelling. This is very important during the first 48 hours.    Apply an ice pack over the injured area for 15 to 20 minutes every 3 to 6 hours. You should do this for the first 24 to 48 hours. You can make an ice pack by filling a plastic bag that seals at the top with ice cubes and then wrapping it with a thin towel. Continue the use of ice packs for relief of pain and swelling as needed. As the ice melts, be careful to avoid getting any wrap or splint wet. After 48 hours, apply heat (warm shower or warm bath) for 15 to 20 minutes several times a day, or alternate ice and heat.    If buddy tape was applied and it becomes wet or dirty, change it. You may replace it with paper, plastic or cloth tape. Cloth tape and paper tapes must be kept dry. Apply gauze or cotton padding between the fingers, especially at the webbed space. This will help prevent the skin from getting moist and breaking down. Keep the buddy tape in place for at least 4 weeks, or as instructed by your healthcare provider.    If a splint was applied, wear it for the time advised.    You may use over-the-counter pain medicine to control pain, unless another pain medicine was prescribed. If you have chronic liver or kidney disease or ever had a stomach ulcer or gastrointestinal bleeding, talk with your healthcare provider before using these medicines.  Follow-up care  Follow up with your healthcare provider, or as directed. Finger joints will become stiff if immobile for too long. If a splint was applied, ask your healthcare  provider when it is safe to begin range-of-motion exercises.  Sometimes fractures don t show up on the first X-ray. Bruises and sprains can sometimes hurt as much as a fracture. These injuries can take time to heal completely. If your symptoms don t improve or they get worse, talk with your healthcare provider. You may need a repeat X-ray. If X-rays were taken, you will be told of any new findings that may affect your care.  When to seek medical advice  Call your healthcare provider right away if any of these occur:    Pain or swelling increases    Fingers or hand becomes cold, blue, numb, or tingly  Date Last Reviewed: 5/1/2018 2000-2018 [employer]. [address] [place] [address], [place] [address]. All rights reserved. This information is not intended as a substitute for professional medical care. Always follow your healthcare professional's instructions.

## 2019-10-10 NOTE — NURSING NOTE
"Injectable Influenza Immunization Documentation    1.  Has the patient received the information for the injectable influenza vaccine? YES     2. Is the patient 6 months of age or older? YES     3. Does the patient have any of the following contraindications?         Severe allergy to eggs? No     Severe allergic reaction to previous influenza vaccines? No   Severe allergy to latex? No       History of Guillain-[place] syndrome? No     Currently have a temperature greater than 100.4F? No        4.  Severely egg allergic patients should have flu vaccine eligibility assessed by an MD, RN, or pharmacist, and those who received flu vaccine should be observed for 15 min by an MD, RN, Pharmacist, Medical Technician, or member of clinic staff.\": YES    5. Latex-allergic patients should be given latex-free influenza vaccine Yes. Please reference the Vaccine latex table to determine if your clinic s product is latex-containing.       Vaccination given by Michael Saba, EMT          "

## 2019-10-21 ENCOUNTER — TRANSFERRED RECORDS (OUTPATIENT)
Dept: HEALTH INFORMATION MANAGEMENT | Facility: CLINIC | Age: 79
End: 2019-10-21

## 2019-12-18 ENCOUNTER — TELEPHONE (OUTPATIENT)
Dept: FAMILY MEDICINE | Facility: CLINIC | Age: 79
End: 2019-12-18

## 2019-12-18 NOTE — TELEPHONE ENCOUNTER
[facility] Call Center    Phone Message    May a detailed message be left on voicemail: yes    Reason for Call: Other: patient would like to discuss a podiatry referral for her ingrown toenails. Please call to discuss thank you.      Action Taken: Message routed to:  [facility]: [place]

## 2019-12-19 ENCOUNTER — TELEPHONE (OUTPATIENT)
Dept: ORTHOPEDICS | Facility: CLINIC | Age: 79
End: 2019-12-19

## 2019-12-19 ENCOUNTER — NURSE TRIAGE (OUTPATIENT)
Dept: FAMILY MEDICINE | Facility: CLINIC | Age: 79
End: 2019-12-19

## 2019-12-19 DIAGNOSIS — L60.0 INGROWN TOENAIL: Primary | ICD-10-CM

## 2019-12-19 NOTE — TELEPHONE ENCOUNTER
Patient c/o bilateral big toe ingrown toenails. Denies swelling, redness, pus, fever. She does have moderate pain, making it somewhat difficult to walk. She has had these in the past a couple times and sees a podiatrist in an outside clinic, but the appointment she had needed to be canceled by the provider.     Reason for Disposition    [1] MODERATE pain (e.g., limping, interferes with normal activities) AND [2] present > 3 days    Protocols used: TOENAIL - INGRDAVIN    Provided home care advice. Will try and get patient into clinic today or tomorrow, she agrees to this plan. I will place a referral to Podiatry as well.     Emily Quiles RN  12/19/19  10:14 AM

## 2019-12-19 NOTE — TELEPHONE ENCOUNTER
RN called and left a detailed message to patient. RN previously consulted with [facility] regarding the plans. Earliest avail is March since Dr. Echevarria will be gone for two months and won't be back till March. Mervat suggested to patient to try [facility] and to see Dr. Navarro. If the pain is really bad patient should seek urgent care. Patient relayed all that info for patient.    Aysha Harris RN

## 2019-12-19 NOTE — TELEPHONE ENCOUNTER
[facility] Call Center    Phone Message    May a detailed message be left on voicemail: yes    Reason for Call: Other: Pt referred for ingrown toe nail. Pt has lyphodema in both legs and the ingrown toe nails are red and painful. Pt thinking it might be infected. Ingrown toe nail in on the big toe on both feet.     Pt is going out of town on Monday and Pt is really hoping to get this looked at this wk.     Please call pt back.     Action Taken: Message routed to:  [facility] ortho

## 2019-12-19 NOTE — TELEPHONE ENCOUNTER
Patient c/o big toe ingrown toenails on both feet. See Triage encounter.     Emily Quiles RN  12/19/19  10:14 AM

## 2020-01-06 ENCOUNTER — TELEPHONE (OUTPATIENT)
Dept: FAMILY MEDICINE | Facility: CLINIC | Age: 80
End: 2020-01-06

## 2020-01-06 NOTE — TELEPHONE ENCOUNTER
[facility] Call Center    Phone Message    May a detailed message be left on voicemail: no    Reason for Call: Order(s): Other:   Reason for requested: Kidney Function, Creatinine, Pt wants blood/urine done  Date needed: 1/9/20  Provider name: Dr. Moreau      Action Taken: Message routed to:  [facility]: [facility] [place]

## 2020-01-06 NOTE — TELEPHONE ENCOUNTER
"Spoke to pt - states she has been in [place] in recent weeks. States she notes that her urine has been \"colorless\" for about a month; also notes fatigue and some lower back pain. Denies fever; able to eat and drink, States despite drinking 'plenty of fluids, I have a dry mouth\". Is concerned about lack of energy - states \"I am an energizer bunny\". Reluctant to go to [facility] in [place] - states she does not want her  to know about this.   Advised pt she should be seen in next day for her symptoms - could at least go to local urgent care for eval; she may need blood and urine tests. Pt agrees to this, and will likely go to urgent care today or Tues; requests appt later in week with Dr Moreau - appt made for Fri afternoon with Dr Moreau.   Pt will call back prn.  Lia Sanders RN  [facility]  "

## 2020-01-07 ENCOUNTER — TRANSFERRED RECORDS (OUTPATIENT)
Dept: HEALTH INFORMATION MANAGEMENT | Facility: CLINIC | Age: 80
End: 2020-01-07

## 2020-02-05 ENCOUNTER — TELEPHONE (OUTPATIENT)
Dept: FAMILY MEDICINE | Facility: CLINIC | Age: 80
End: 2020-02-05

## 2020-02-05 NOTE — TELEPHONE ENCOUNTER
Patient requesting labs from a [facility] provider. Advised patient that outside lab orders need to come from the provider ordering them. Provided patient with clinic fax number and asked her to contact [facility] provider and ask for lab orders and where results are to be sent. Patient agrees to do this.     Emily Quiles RN  02/05/20  10:13 AM

## 2020-02-05 NOTE — TELEPHONE ENCOUNTER
[facility]    Phone Message    May a detailed message be left on voicemail: yes     Reason for Call: Order(s): Other:   Reason for requested: Pt wants to check her hemoglobin and phosphorus   Date needed: asap - please call pt once orders have been placed.   Provider name: Dr. Moreau       Action Taken: Message routed to:  [facility]: [facility] [place], [place]    Travel Screening: Not Applicable

## 2020-02-11 ENCOUNTER — APPOINTMENT (OUTPATIENT)
Dept: LAB | Facility: CLINIC | Age: 80
End: 2020-02-11
Attending: PATHOLOGY
Payer: MEDICARE

## 2020-02-11 DIAGNOSIS — Z01.89 ENCOUNTER FOR LABORATORY TEST: Primary | ICD-10-CM

## 2020-02-11 NOTE — PROGRESS NOTES
Performed venipunctured from an outside provider ([facility])  Specimen get send to .    Talita Mobley CMA,WENDY  February 11, 2020 10:43 AM

## 2020-06-01 NOTE — PROGRESS NOTES
"Family Medicine Telephone Visit Note  Consent and telephone details are below    Subjective     Joan Kiran is a 80 year old female who presents to clinic today for the following health issues:    Chief Complaint   Patient presents with     Gastrointestinal Problem     abdominal pain and distention since April, loose stools, frequent gas, pepto-bismal is helping,           Diarrhea     Onset: April 2020    Upper Abdominal bloating, rumbling intestines in lower abdomen and loose stools    Fecal incontinence liquid at times    Explosive gas with fragment of stool, no formed stool since this began    Can be 4x in an hour, then again in several hours, usually later in the day.     No blood, no fever. No nausea or vomiting, no joint pain or mouth ulcers    No antibiotic use prior to symptoms.     Tried pepto bismol last weekend which helped    Prior to April , never paid attention to her BM, they were normal    She reports onset of these symptoms occurred after she had traveled to [place] (had bought a home there) but had returned to [place] to take care of a friend after a hospital stay. During that time she reports having eaten some \"very ripe\" cheese. Both she and her friend had diarrhea x 3 days then symptoms resolved for at least a week before the above symptoms began.     No antibiotic use prior to symptoms.     Lost 10 pounds but happy about that.  Has always had hot flashes, no red or black stool.  No noctural symptoms    Spouse wishes to travel to [place] next Monday to a home they have there. Planning to be in [place] for more than a month.       Patient Active Problem List   Diagnosis     History of pulmonary embolism     Concussion     Dysthymia     History of colonic polyps     Lymphedema     Migraine     Pancreatic abnormality     Sleep apnea     Esophageal reflux     Past Surgical History:   Procedure Laterality Date     CHOLECYSTECTOMY       ORBITOTOMY  03/2017       Social History     Tobacco Use     " "Smoking status: Former Smoker     Last attempt to quit: 1982     Years since quittin.4     Smokeless tobacco: Never Used   Substance Use Topics     Alcohol use: No     Family History   Problem Relation Age of Onset     Dementia Mother      Heart Disease Father      Cerebrovascular Disease Father         TIAs     Heart Disease Paternal Grandfather      Breast Cancer Maternal Aunt         x2     Cancer Paternal Aunt         uterine         Reviewed and updated as needed this visit by Provider         Review of Systems   Constitutional, HEENT, cardiovascular, pulmonary, gi and gu systems are negative, except as otherwise noted.      Objective    /72   Temp 96.5  F (35.8  C)   Ht 1.651 m (5' 5\")   Wt 63.5 kg (140 lb)   LMP 1985   BMI 23.30 kg/m    Body mass index is 23.3 kg/m .  General:alert and no distress        Assessment/Plan:  (R19.7) Diarrhea, unspecified type  (primary encounter diagnosis)  Comment: DDX is infectious, SIBO, collagenous colitis, other  Plan: Clostridium difficile toxin B PCR, Ova and         Parasite Exam Routine, Enteric Bacteria and         Virus Panel by [facility] Stool,         Cryptosporidium/Giardia Immunoassay, Fecal         colorectal cancer screen FIT        Recommend stool cultures now. May use imodium if no blood and no fever. Is stool cultures negative, may need to follow up with GI specialist in [place].     Shelby Moreau MD    TELEPHONE VISIT DETAILS and Consent    Aretha Kiran is a 80 year old female who is being evaluated via a billable telephone visit.      The patient has been notified of following:     \"This telephone visit will be conducted via a call between you and your physician/provider. We have found that certain health care needs can be provided without the need for a physical exam.  This service lets us provide the care you need with a short phone conversation.  If a prescription is necessary we can send it directly to your pharmacy.  If lab " "work is needed we can place an order for that and you can then stop by our lab to have the test done at a later time.    Telephone visits are billed at different rates depending on your insurance coverage. During this emergency period, for some insurers they may be billed the same as an in-person visit.  Please reach out to your insurance provider with any questions.    If during the course of the call the physician/provider feels a telephone visit is not appropriate, you will not be charged for this service.\"    Patient has given verbal consent for Telephone visit?  Yes    How would you like to obtain your AVS? MyChart    After Visit Information:  Patient declined AVS     Appointment start time: 10:24    Appointment end time: 10:40 AM    Phone call duration:  16 minutes                                     "

## 2020-06-02 ENCOUNTER — VIRTUAL VISIT (OUTPATIENT)
Dept: FAMILY MEDICINE | Facility: CLINIC | Age: 80
End: 2020-06-02
Payer: MEDICARE

## 2020-06-02 VITALS
TEMPERATURE: 96.5 F | DIASTOLIC BLOOD PRESSURE: 72 MMHG | HEIGHT: 65 IN | BODY MASS INDEX: 23.32 KG/M2 | SYSTOLIC BLOOD PRESSURE: 119 MMHG | WEIGHT: 140 LBS

## 2020-06-02 DIAGNOSIS — R19.7 DIARRHEA, UNSPECIFIED TYPE: Primary | ICD-10-CM

## 2020-06-02 ASSESSMENT — ANXIETY QUESTIONNAIRES
GAD7 TOTAL SCORE: 1
3. WORRYING TOO MUCH ABOUT DIFFERENT THINGS: NOT AT ALL
2. NOT BEING ABLE TO STOP OR CONTROL WORRYING: NOT AT ALL
5. BEING SO RESTLESS THAT IT IS HARD TO SIT STILL: NOT AT ALL
IF YOU CHECKED OFF ANY PROBLEMS ON THIS QUESTIONNAIRE, HOW DIFFICULT HAVE THESE PROBLEMS MADE IT FOR YOU TO DO YOUR WORK, TAKE CARE OF THINGS AT HOME, OR GET ALONG WITH OTHER PEOPLE: NOT DIFFICULT AT ALL
7. FEELING AFRAID AS IF SOMETHING AWFUL MIGHT HAPPEN: SEVERAL DAYS
6. BECOMING EASILY ANNOYED OR IRRITABLE: NOT AT ALL
1. FEELING NERVOUS, ANXIOUS, OR ON EDGE: NOT AT ALL

## 2020-06-02 ASSESSMENT — PATIENT HEALTH QUESTIONNAIRE - PHQ9
SUM OF ALL RESPONSES TO PHQ QUESTIONS 1-9: 2
5. POOR APPETITE OR OVEREATING: NOT AT ALL

## 2020-06-02 ASSESSMENT — MIFFLIN-ST. JEOR: SCORE: 1115.92

## 2020-06-03 ASSESSMENT — ANXIETY QUESTIONNAIRES: GAD7 TOTAL SCORE: 1

## 2020-06-05 DIAGNOSIS — R19.7 DIARRHEA, UNSPECIFIED TYPE: ICD-10-CM

## 2020-06-06 ENCOUNTER — NURSE TRIAGE (OUTPATIENT)
Dept: NURSING | Facility: CLINIC | Age: 80
End: 2020-06-06

## 2020-06-06 DIAGNOSIS — R19.7 DIARRHEA, UNSPECIFIED TYPE: ICD-10-CM

## 2020-06-06 LAB
C COLI+JEJUNI+LARI FUSA STL QL NAA+PROBE: NORMAL
C DIFF TOX B STL QL: NORMAL
EC STX1 GENE STL QL NAA+PROBE: NORMAL
EC STX2 GENE STL QL NAA+PROBE: NORMAL
ENTERIC PATHOGEN COMMENT: NORMAL
HEMOCCULT STL QL IA: NEGATIVE
NOROV GI+II ORF1-ORF2 JNC STL QL NAA+PR: NORMAL
RVA NSP5 STL QL NAA+PROBE: NORMAL
SALMONELLA SP RPOD STL QL NAA+PROBE: NORMAL
SHIGELLA SP+EIEC IPAH STL QL NAA+PROBE: NORMAL
SPECIMEN SOURCE: NORMAL
V CHOL+PARA RFBL+TRKH+TNAA STL QL NAA+PR: NORMAL
Y ENTERO RECN STL QL NAA+PROBE: NORMAL

## 2020-06-06 PROCEDURE — 82274 ASSAY TEST FOR BLOOD FECAL: CPT | Performed by: INTERNAL MEDICINE

## 2020-06-06 NOTE — TELEPHONE ENCOUNTER
Pt of Dr Barnes       Stool sample drop off needed       [facility] closed - where else?      Suggested [facility] - connected with lab staff there .. yes, can accept for testing         Bi Andrade RN     Additional Information    [1] Follow-up call to recent contact AND [2] information only call, no triage required    Protocols used: INFORMATION ONLY CALL-A-AH

## 2020-06-08 LAB
C PARVUM AG STL QL IA: NEGATIVE
G LAMBLIA AG STL QL IA: NEGATIVE
O+P STL MICRO: NORMAL
O+P STL MICRO: NORMAL
SPECIMEN SOURCE: NORMAL
SPECIMEN SOURCE: NORMAL

## 2020-06-11 ENCOUNTER — TRANSFERRED RECORDS (OUTPATIENT)
Dept: HEALTH INFORMATION MANAGEMENT | Facility: CLINIC | Age: 80
End: 2020-06-11

## 2020-06-12 ENCOUNTER — TELEPHONE (OUTPATIENT)
Dept: FAMILY MEDICINE | Facility: CLINIC | Age: 80
End: 2020-06-12

## 2020-06-12 NOTE — TELEPHONE ENCOUNTER
Called and LVM - no orders or prescriptions were needed. Lab results were relayed via VM to patient on 6/9/20 by Dr. Moreau. If questions, please call back to speak to clinic RN.    Emily Quiles RN  06/12/20  3:27 PM

## 2020-06-12 NOTE — TELEPHONE ENCOUNTER
[facility] Call [facility]    Phone Message    May a detailed message be left on voicemail: yes     Reason for Call: Requesting Results   Name/type of test: Lab   Date of test: 6/5/20  Was test done at a location other than [facility]?: No - [facility]     If any prescriptions need to be sent in, pt wants them sent to [facility]  #[phone]        Action Taken: Message routed to:  [facility]: [facility]    Travel Screening: Not Applicable

## 2020-06-18 ENCOUNTER — TRANSFERRED RECORDS (OUTPATIENT)
Dept: HEALTH INFORMATION MANAGEMENT | Facility: CLINIC | Age: 80
End: 2020-06-18

## 2020-06-23 ENCOUNTER — TRANSFERRED RECORDS (OUTPATIENT)
Dept: HEALTH INFORMATION MANAGEMENT | Facility: CLINIC | Age: 80
End: 2020-06-23

## 2020-06-29 ENCOUNTER — TRANSFERRED RECORDS (OUTPATIENT)
Dept: HEALTH INFORMATION MANAGEMENT | Facility: CLINIC | Age: 80
End: 2020-06-29

## 2020-12-06 ENCOUNTER — HEALTH MAINTENANCE LETTER (OUTPATIENT)
Age: 80
End: 2020-12-06

## 2021-01-21 ENCOUNTER — TRANSFERRED RECORDS (OUTPATIENT)
Dept: HEALTH INFORMATION MANAGEMENT | Facility: CLINIC | Age: 81
End: 2021-01-21

## 2021-05-11 ENCOUNTER — TRANSFERRED RECORDS (OUTPATIENT)
Dept: HEALTH INFORMATION MANAGEMENT | Facility: CLINIC | Age: 81
End: 2021-05-11

## 2021-05-13 ENCOUNTER — OFFICE VISIT (OUTPATIENT)
Dept: FAMILY MEDICINE | Facility: CLINIC | Age: 81
End: 2021-05-13
Payer: MEDICARE

## 2021-05-13 VITALS
HEART RATE: 68 BPM | OXYGEN SATURATION: 95 % | RESPIRATION RATE: 15 BRPM | WEIGHT: 143.4 LBS | SYSTOLIC BLOOD PRESSURE: 118 MMHG | DIASTOLIC BLOOD PRESSURE: 50 MMHG | HEIGHT: 65 IN | TEMPERATURE: 97.2 F | BODY MASS INDEX: 23.89 KG/M2

## 2021-05-13 DIAGNOSIS — I89.0 LYMPHEDEMA: Primary | ICD-10-CM

## 2021-05-13 DIAGNOSIS — Z48.02 VISIT FOR SUTURE REMOVAL: ICD-10-CM

## 2021-05-13 ASSESSMENT — MIFFLIN-ST. JEOR: SCORE: 1126.34

## 2021-05-13 NOTE — NURSING NOTE
"81 year old  Chief Complaint   Patient presents with     Suture Removal     1 stitich left leg        Blood pressure 118/50, pulse 68, temperature 97.2  F (36.2  C), resp. rate 15, height 1.651 m (5' 5\"), weight 65 kg (143 lb 6.4 oz), last menstrual period 1985, SpO2 95 %. Body mass index is 23.86 kg/m .  Patient Active Problem List   Diagnosis     History of pulmonary embolism     Concussion     Dysthymia     History of colonic polyps     Lymphedema     Migraine     Pancreatic abnormality     Sleep apnea     Esophageal reflux     Diarrhea, unspecified type       Wt Readings from Last 2 Encounters:   21 65 kg (143 lb 6.4 oz)   20 63.5 kg (140 lb)     BP Readings from Last 3 Encounters:   21 118/50   20 119/72   10/10/19 100/59         Current Outpatient Medications   Medication     amoxicillin (AMOXIL) 500 MG capsule     aspirin 81 MG EC tablet     calcium 600 MG tablet     hydrochlorothiazide (HYDRODIURIL) 25 MG tablet     levothyroxine (SYNTHROID/LEVOTHROID) 50 MCG tablet     losartan (COZAAR) 25 MG tablet     simvastatin (ZOCOR) 20 MG tablet     traZODone (DESYREL) 100 MG tablet     venlafaxine (EFFEXOR-ER) 150 MG TB24     No current facility-administered medications for this visit.        Social History     Tobacco Use     Smoking status: Former Smoker     Quit date: 1982     Years since quittin.3     Smokeless tobacco: Never Used   Substance Use Topics     Alcohol use: No     Drug use: No       Health Maintenance Due   Topic Date Due     DEXA  Never done     ADVANCE CARE PLANNING  Never done     DEPRESSION ACTION PLAN  Never done     COVID-19 Vaccine (1) Never done     HEPATITIS C SCREENING  Never done     LIPID  Never done     MEDICARE ANNUAL WELLNESS VISIT  Never done     FALL RISK ASSESSMENT  2020     PHQ-9  2020       No results found for: PAP      May 13, 2021 10:54 AM  "

## 2021-05-13 NOTE — PROGRESS NOTES
SUBJECTIVE:   Aretha Kiran is a 81 year old female who presents to clinic today to discuss the following problem(s).    Suture removal  - had punch biopsy performed int [place]  - per patient, she has a history of primary lymphedema (as did her father); chart appears to indicate she also does suffer from stage 3b CKD  - was in [place] and developed significant bilateral lymphedema, was concerned for possible cellulitis but was told this was unlikely  - punch biopsy was performed and 1 closed suture was placed at the site on her LEFT thigh  - she presents today to have suture removed.        ROS: 10 point ROS neg other than the symptoms noted above in the HPI.      Past Medical History:   Diagnosis Date     Orbital floor fracture (H)      Past Surgical History:   Procedure Laterality Date     CHOLECYSTECTOMY       ORBITOTOMY  2017     Family History   Problem Relation Age of Onset     Dementia Mother      Heart Disease Father      Cerebrovascular Disease Father         TIAs     Heart Disease Paternal Grandfather      Breast Cancer Maternal Aunt         x2     Cancer Paternal Aunt         uterine     Social History     Tobacco Use     Smoking status: Former Smoker     Quit date: 1982     Years since quittin.3     Smokeless tobacco: Never Used   Substance Use Topics     Alcohol use: No     Drug use: No     Social History     Social History Narrative     Not on file       Current Outpatient Medications   Medication     amoxicillin (AMOXIL) 500 MG capsule     aspirin 81 MG EC tablet     calcium 600 MG tablet     hydrochlorothiazide (HYDRODIURIL) 25 MG tablet     levothyroxine (SYNTHROID/LEVOTHROID) 50 MCG tablet     losartan (COZAAR) 25 MG tablet     simvastatin (ZOCOR) 20 MG tablet     traZODone (DESYREL) 100 MG tablet     venlafaxine (EFFEXOR-ER) 150 MG TB24     No current facility-administered medications for this visit.      I have reviewed the patient's past medical, surgical, family, and social history.  "    OBJECTIVE:   /50   Pulse 68   Temp 97.2  F (36.2  C)   Resp 15   Ht 1.651 m (5' 5\")   Wt 65 kg (143 lb 6.4 oz)   LMP 01/01/1985   SpO2 95%   BMI 23.86 kg/m      Constitutional: well-appearing, appears stated age  Eyes: conjunctivae without erythema, sclera anicteric.   Cardiac: regular rate and rhythm, normal S1/S2, no murmur/rubs/gallops  Respiratory: lungs clear to auscultation bilaterally, normal work of breathing, no wheezes/crackles  Extremities: bilateral LE edema, worse on LEFT where compression stocking has been lowered  Skin: suture site on LEFT thigh appears CDI with no evidence of infection, no bleeding or drainage  Psych: affect is full and appropriate, speech is fluent and non-pressured    ASSESSMENT AND PLAN:     Aretha was seen today for suture removal.    Diagnoses and all orders for this visit:    Lymphedema    Visit for suture removal  -     REMOVAL OF SUTURES    Discussed possible reasons for lymphedema exacerbation in [place]. I suspect vasodilation in the setting of heat and humidity on the beach for a day overwhelmed preventive capacity of her compression stockings. I see no concern for cellulitic infection at this time. She asks about any other options for treatment of her lymphedema. She is already plugged in with [facility] and recently met with someone there who was able to wrap her legs very effectively to reduce significant edema. She has since been unable to repeat the wrapping herself. I think it's reasonable for her to go back to [facility] to see if someone teach her to effectively wrap her legs herself.     1 suture removed without bleeding or drainage. Procedure well tolerated by patient. Encouraged patient to apply petroleum ointment to the area to assist with healing.       Silverio Matias MD  [facility]  05/13/2021, 7:51 AM    "

## 2021-09-25 ENCOUNTER — HEALTH MAINTENANCE LETTER (OUTPATIENT)
Age: 81
End: 2021-09-25

## 2022-01-15 ENCOUNTER — HEALTH MAINTENANCE LETTER (OUTPATIENT)
Age: 82
End: 2022-01-15

## 2022-12-26 ENCOUNTER — HEALTH MAINTENANCE LETTER (OUTPATIENT)
Age: 82
End: 2022-12-26

## 2023-04-22 ENCOUNTER — HEALTH MAINTENANCE LETTER (OUTPATIENT)
Age: 83
End: 2023-04-22

## 2023-05-26 ENCOUNTER — NURSE TRIAGE (OUTPATIENT)
Dept: FAMILY MEDICINE | Facility: CLINIC | Age: 83
End: 2023-05-26

## 2023-05-26 NOTE — TELEPHONE ENCOUNTER
Reason for Disposition    EXPOSURE of non-intact skin (e.g., exposed person has dermatitis, abrasion, wound)  with animal BODY FLUID (e.g., saliva such as licking, blood, brain) and animal at high-risk for RABIES (e.g., bat, raccoon, antoine, skunk, coyote, other carnivores)    Answer Assessment - Initial Assessment Questions  1. ANIMAL: Cat  2. LOCATION: Right index finger  3. SIZE: Three puncture wounds; one on the front between the bottom of the fingernail and the skin and two on the bottom of the finger.  4. ONSET: 5/25/23 at [facility]  5. CIRCUMSTANCES: Was holding her cat at the vet's office and she bit her during the exam.  6. TETANUS: 6/16/15  7. PREGNANCY: No    Due to an appointment shortage and the fact that her finger is swollen enough she cannot bend the first digit, advised she be seen at an urgent care to be examined, perhaps have the finger lanced to drain the infection and receive an antibiotic.  Provided a few urgent care locations and she will go now.    BRADLEY HernandezN, RN, [facility]  RN Care Coordinator  [facility]  05/26/23  9:34 AM  Phone: [phone]    Protocols used: ANIMAL BITE-A-OH

## 2023-06-27 ENCOUNTER — OFFICE VISIT (OUTPATIENT)
Dept: FAMILY MEDICINE | Facility: CLINIC | Age: 83
End: 2023-06-27
Payer: MEDICARE

## 2023-06-27 VITALS
WEIGHT: 143 LBS | TEMPERATURE: 97.7 F | RESPIRATION RATE: 15 BRPM | SYSTOLIC BLOOD PRESSURE: 106 MMHG | BODY MASS INDEX: 23.82 KG/M2 | HEIGHT: 65 IN | HEART RATE: 70 BPM | DIASTOLIC BLOOD PRESSURE: 67 MMHG | OXYGEN SATURATION: 97 %

## 2023-06-27 DIAGNOSIS — N18.32 STAGE 3B CHRONIC KIDNEY DISEASE (H): ICD-10-CM

## 2023-06-27 DIAGNOSIS — N30.00 ACUTE CYSTITIS WITHOUT HEMATURIA: Primary | ICD-10-CM

## 2023-06-27 DIAGNOSIS — F43.9 SITUATIONAL STRESS: ICD-10-CM

## 2023-06-27 DIAGNOSIS — J30.2 SEASONAL ALLERGIC RHINITIS, UNSPECIFIED TRIGGER: ICD-10-CM

## 2023-06-27 LAB
ALBUMIN UR-MCNC: NEGATIVE MG/DL
APPEARANCE UR: ABNORMAL
BILIRUB UR QL STRIP: NEGATIVE
COLOR UR AUTO: YELLOW
GLUCOSE UR STRIP-MCNC: NEGATIVE MG/DL
HGB UR QL STRIP: ABNORMAL
KETONES UR STRIP-MCNC: NEGATIVE MG/DL
LEUKOCYTE ESTERASE UR QL STRIP: ABNORMAL
NITRATE UR QL: POSITIVE
PH UR STRIP: 6 [PH] (ref 5–7)
SP GR UR STRIP: 1.01 (ref 1–1.03)
UROBILINOGEN UR STRIP-ACNC: 0.2 E.U./DL

## 2023-06-27 PROCEDURE — 87086 URINE CULTURE/COLONY COUNT: CPT | Mod: ORL | Performed by: INTERNAL MEDICINE

## 2023-06-27 PROCEDURE — 87186 SC STD MICRODIL/AGAR DIL: CPT | Mod: ORL | Performed by: INTERNAL MEDICINE

## 2023-06-27 RX ORDER — LOSARTAN POTASSIUM AND HYDROCHLOROTHIAZIDE 12.5; 5 MG/1; MG/1
1 TABLET ORAL
COMMUNITY
Start: 2023-06-09 | End: 2024-08-22

## 2023-06-27 RX ORDER — CIPROFLOXACIN 250 MG/1
250 TABLET, FILM COATED ORAL 2 TIMES DAILY
Qty: 14 TABLET | Refills: 0 | Status: SHIPPED | OUTPATIENT
Start: 2023-06-27 | End: 2024-08-21

## 2023-06-27 RX ORDER — IPRATROPIUM BROMIDE 42 UG/1
2 SPRAY, METERED NASAL 4 TIMES DAILY
COMMUNITY
Start: 2023-06-27

## 2023-06-27 RX ORDER — IPRATROPIUM BROMIDE 21 UG/1
2 SPRAY, METERED NASAL EVERY 12 HOURS
Qty: 30 ML | Refills: 11 | COMMUNITY
Start: 2023-06-27 | End: 2023-06-27

## 2023-06-27 RX ORDER — ROSUVASTATIN CALCIUM 5 MG/1
TABLET, COATED ORAL
COMMUNITY
Start: 2023-06-07 | End: 2024-08-21

## 2023-06-27 ASSESSMENT — PATIENT HEALTH QUESTIONNAIRE - PHQ9
5. POOR APPETITE OR OVEREATING: NOT AT ALL
SUM OF ALL RESPONSES TO PHQ QUESTIONS 1-9: 9

## 2023-06-27 ASSESSMENT — ANXIETY QUESTIONNAIRES
GAD7 TOTAL SCORE: 4
GAD7 TOTAL SCORE: 4
7. FEELING AFRAID AS IF SOMETHING AWFUL MIGHT HAPPEN: SEVERAL DAYS
6. BECOMING EASILY ANNOYED OR IRRITABLE: SEVERAL DAYS
3. WORRYING TOO MUCH ABOUT DIFFERENT THINGS: NOT AT ALL
1. FEELING NERVOUS, ANXIOUS, OR ON EDGE: SEVERAL DAYS
5. BEING SO RESTLESS THAT IT IS HARD TO SIT STILL: NOT AT ALL
IF YOU CHECKED OFF ANY PROBLEMS ON THIS QUESTIONNAIRE, HOW DIFFICULT HAVE THESE PROBLEMS MADE IT FOR YOU TO DO YOUR WORK, TAKE CARE OF THINGS AT HOME, OR GET ALONG WITH OTHER PEOPLE: SOMEWHAT DIFFICULT
2. NOT BEING ABLE TO STOP OR CONTROL WORRYING: SEVERAL DAYS
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
## 2023-06-27 NOTE — PATIENT INSTRUCTIONS
Psychology today web page for therapist  [email or web address]

## 2023-06-27 NOTE — NURSING NOTE
"83 year old  Chief Complaint   Patient presents with     UTI     Two weeks ago, at [place],  Pt was given a new prep for the colonoscopy before their colonoscopy. The prep didn't start on time and interfered with the colonoscopy. A couple days after the procedure, Pt reports frequent or urgency to urinate, cloudy urine, and itchiness.        Blood pressure 106/67, pulse 70, temperature 97.7  F (36.5  C), temperature source Skin, resp. rate 15, height 1.651 m (5' 5\"), weight 64.9 kg (143 lb), last menstrual period 1985, SpO2 97 %. Body mass index is 23.8 kg/m .  Patient Active Problem List   Diagnosis     History of pulmonary embolism     Concussion     Dysthymia     History of colonic polyps     Lymphedema     Migraine     Pancreatic abnormality     Sleep apnea     Esophageal reflux     Diarrhea, unspecified type       Wt Readings from Last 2 Encounters:   23 64.9 kg (143 lb)   21 65 kg (143 lb 6.4 oz)     BP Readings from Last 3 Encounters:   23 106/67   21 118/50   20 119/72         Current Outpatient Medications   Medication     amoxicillin (AMOXIL) 500 MG capsule     calcium 600 MG tablet     hydrochlorothiazide (HYDRODIURIL) 25 MG tablet     losartan-hydrochlorothiazide (HYZAAR) 50-12.5 MG tablet     rosuvastatin (CRESTOR) 5 MG tablet     simvastatin (ZOCOR) 20 MG tablet     traZODone (DESYREL) 100 MG tablet     venlafaxine (EFFEXOR-ER) 150 MG TB24     aspirin 81 MG EC tablet     levothyroxine (SYNTHROID/LEVOTHROID) 50 MCG tablet     losartan (COZAAR) 25 MG tablet     No current facility-administered medications for this visit.       Social History     Tobacco Use     Smoking status: Former     Types: Cigarettes     Quit date: 1982     Years since quittin.5     Smokeless tobacco: Never   Vaping Use     Vaping Use: Never used   Substance Use Topics     Alcohol use: No     Drug use: No       Health Maintenance Due   Topic Date Due     DEXA  Never done     ADVANCE CARE " PLANNING  Never done     DEPRESSION ACTION PLAN  Never done     COVID-19 Vaccine (1) Never done     MEDICARE ANNUAL WELLNESS VISIT  Never done     FALL RISK ASSESSMENT  09/13/2020       No results found for: PAP      June 27, 2023 5:02 PM

## 2023-06-29 LAB — BACTERIA UR CULT: ABNORMAL

## 2024-03-14 ENCOUNTER — VIRTUAL VISIT (OUTPATIENT)
Dept: FAMILY MEDICINE | Facility: CLINIC | Age: 84
End: 2024-03-14
Payer: MEDICARE

## 2024-03-14 DIAGNOSIS — F43.9 SITUATIONAL STRESS: ICD-10-CM

## 2024-03-14 DIAGNOSIS — B34.9 VIRAL SYNDROME: Primary | ICD-10-CM

## 2024-03-14 ASSESSMENT — ANXIETY QUESTIONNAIRES
GAD7 TOTAL SCORE: 8
1. FEELING NERVOUS, ANXIOUS, OR ON EDGE: SEVERAL DAYS
4. TROUBLE RELAXING: MORE THAN HALF THE DAYS
7. FEELING AFRAID AS IF SOMETHING AWFUL MIGHT HAPPEN: SEVERAL DAYS
7. FEELING AFRAID AS IF SOMETHING AWFUL MIGHT HAPPEN: SEVERAL DAYS
GAD7 TOTAL SCORE: 8
2. NOT BEING ABLE TO STOP OR CONTROL WORRYING: SEVERAL DAYS
6. BECOMING EASILY ANNOYED OR IRRITABLE: SEVERAL DAYS
GAD7 TOTAL SCORE: 8
3. WORRYING TOO MUCH ABOUT DIFFERENT THINGS: SEVERAL DAYS
5. BEING SO RESTLESS THAT IT IS HARD TO SIT STILL: SEVERAL DAYS

## 2024-03-14 ASSESSMENT — PATIENT HEALTH QUESTIONNAIRE - PHQ9
10. IF YOU CHECKED OFF ANY PROBLEMS, HOW DIFFICULT HAVE THESE PROBLEMS MADE IT FOR YOU TO DO YOUR WORK, TAKE CARE OF THINGS AT HOME, OR GET ALONG WITH OTHER PEOPLE: SOMEWHAT DIFFICULT
SUM OF ALL RESPONSES TO PHQ QUESTIONS 1-9: 16
SUM OF ALL RESPONSES TO PHQ QUESTIONS 1-9: 16

## 2024-03-14 NOTE — NURSING NOTE
"Joan  84 year old    Chief Complaint   Patient presents with    Pain     Pain in all of her joints, muscles, head - today, a new spot in her back started to hurt. \"All-over malaise\" This all started on Monday night, she got suddenly cold then her body began to hurt. She rates pain at 6-7. Has taken 2 Tylenol which helped \"a little\"            Last menstrual period 1985. There is no height or weight on file to calculate BMI.    Patient Active Problem List   Diagnosis    History of pulmonary embolism    Concussion    Dysthymia    History of colonic polyps    Lymphedema    Migraine    Pancreatic abnormality    Sleep apnea    Esophageal reflux    Diarrhea, unspecified type    Stage 3b chronic kidney disease (H)    Seasonal allergic rhinitis, unspecified trigger    Situational stress              Wt Readings from Last 2 Encounters:   23 64.9 kg (143 lb)   21 65 kg (143 lb 6.4 oz)       BP Readings from Last 3 Encounters:   23 106/67   21 118/50   20 119/72                Current Outpatient Medications   Medication    amoxicillin (AMOXIL) 500 MG capsule    calcium 600 MG tablet    ciprofloxacin (CIPRO) 250 MG tablet    hydrochlorothiazide (HYDRODIURIL) 25 MG tablet    ipratropium (ATROVENT) 0.06 % nasal spray    losartan-hydrochlorothiazide (HYZAAR) 50-12.5 MG tablet    rosuvastatin (CRESTOR) 5 MG tablet    traZODone (DESYREL) 100 MG tablet    venlafaxine (EFFEXOR-ER) 150 MG TB24    aspirin 81 MG EC tablet    levothyroxine (SYNTHROID/LEVOTHROID) 50 MCG tablet    losartan (COZAAR) 25 MG tablet    simvastatin (ZOCOR) 20 MG tablet     No current facility-administered medications for this visit.              Social History     Tobacco Use    Smoking status: Former     Types: Cigarettes     Quit date: 1982     Years since quittin.2    Smokeless tobacco: Never   Vaping Use    Vaping Use: Never used   Substance Use Topics    Alcohol use: No    Drug use: No              Health " "Maintenance Due   Topic Date Due    LIPID  Never done    MICROALBUMIN  Never done    PARATHYROID  Never done    PHOSPHORUS  Never done    ADVANCE CARE PLANNING  Never done    DEPRESSION ACTION PLAN  Never done    RSV VACCINE (Pregnancy & 60+) (1 - 1-dose 60+ series) Never done    MEDICARE ANNUAL WELLNESS VISIT  Never done    HEMOGLOBIN  11/05/2015    BMP  07/18/2018    FALL RISK ASSESSMENT  09/13/2020            No results found for: \"PAP\"           March 14, 2024 1:59 PM    "

## 2024-03-14 NOTE — PROGRESS NOTES
"Joan is a 84 year old who is being evaluated via a billable video visit.    How would you like to obtain your AVS? Mail a copy  If the video visit is dropped, the invitation should be resent by: Text to cell phone: [phone]  Will anyone else be joining your video visit? No    Assessment & Plan     Viral syndrome  Discussed based on chills, subjective fever, malaise, and myalgias likely some sort of viral syndrome but difficult to say exactly what, pasquale without a physical exam. Has no localizing sx. Recommend rest, Tylenol (dosing reviewed) and anticipate improvement over the next 5-7 days. Worrisome signs and symptoms were discussed with Joan and she was instructed to return to the clinic for concerning symptoms or to call with questions.      Situational stress  Has an established mental health provider and no suicidal thoughts or self harm.       Return if symptoms worsen or fail to improve.    Bren Franco is a 84 year old, presenting for the following health issues:  Pain (Pain in all of her joints, muscles, head - today, a new spot in her back started to hurt. \"All-over malaise\" This all started on Monday night, she got suddenly cold then her body began to hurt. She rates pain at 6-7. Has taken 2 Tylenol which helped \"a little\")    Video Start Time: 2:02 PM    HPI   Joan reports that on Monday night, she was going upstairs to the bedroom, felt \"freezing cold\" and shaking all over. Even teeth were chattering. Heating pad helped. The next day, fatigued and slept all day. On Wednesday, she did things but not as much as usual. Just feels like she has no energy. All of her joints hurt, her back hurts, her feet hurt. She did have a fever last night, she didn't check her temp but she felt hot and sweaty. Has taken 2 Tylenol which helped a little. No chest pain, SOB, abdominal pain, sore throat, ear pain, nasal congestion, rashes. Has mild rhinorrhea but feels like it's at her baseline. Uses ipratropium. No " "sick contacts. No recent med changes or recent vaccines.     Took a home Covid test and it was negative.     Reviewed PHQ score, has a lot of situational stress. Has a \"wonderful\" therapist who she will be meeting with tomorrow. Denies any suicidal ideation or self harm.         Objective       Vitals:  No vitals were obtained today due to virtual visit.    Physical Exam   GENERAL: alert and no distress  EYES: Eyes grossly normal to inspection.  No discharge or erythema, or obvious scleral/conjunctival abnormalities.  RESP: No audible wheeze, cough, or visible cyanosis.    SKIN: Visible skin clear. No significant rash, abnormal pigmentation or lesions.  NEURO: Cranial nerves grossly intact.  Mentation and speech appropriate for age.  PSYCH: Appropriate affect, tone, and pace of words        6/2/2020    10:13 AM 6/27/2023     4:56 PM 3/14/2024     1:44 PM   PHQ   PHQ-9 Total Score 2 9 16   Q9: Thoughts of better off dead/self-harm past 2 weeks Not at all Not at all Several days   F/U: Thoughts of suicide or self-harm   No   F/U: Safety concerns   No           Video-Visit Details  Type of service:  Video Visit   Video End Time: 2:11 PM  Originating Location (pt. Location): Home  Distant Location (provider location):  On-site  Platform used for Video Visit: Adriano  Signed Electronically by: Sonali Corral MD  "

## 2024-06-23 ENCOUNTER — HEALTH MAINTENANCE LETTER (OUTPATIENT)
Age: 84
End: 2024-06-23

## 2024-08-20 ENCOUNTER — NURSE TRIAGE (OUTPATIENT)
Dept: FAMILY MEDICINE | Facility: CLINIC | Age: 84
End: 2024-08-20

## 2024-08-20 NOTE — TELEPHONE ENCOUNTER
"Pt and  called from their car to report Joan has been dizzy for several days (they were in [place] until last night).  Today they went to eye clinic for her macular degeneration related eye injection and her BP was very low 90/50 prior to the injection. She received the injection and continues to feel unsteady and exhausted.  She has not had much to eat or drink today, but does currently have a bottle of water in her hand.  Her medication list is not up to date so her , Blake, read the list over the phone:    Losartan-HCTZ 50 mg/12.5 MG  Venlafaxine 225 MG  Levothyroxine 50  Rosuvastatin 5 MG  Trazodone 50 MG    Advised seeking emergent care but they declined and plan on going home. Joan typically lies down and sleeps for a few hours after receiving the eye injection.   Advised Pedialyte or Gatorade (at least a quart tonight) and eat some food.     YOSVANY Leal, RN  08/20/24, 4:33 PM      Reason for Disposition   [1] Fall in systolic BP > 20 mm Hg from normal AND [2] dizzy, lightheaded, or weak    Answer Assessment - Initial Assessment Questions  1. BLOOD PRESSURE: \"What is the blood pressure?\" \"Did you take at least two measurements 5 minutes apart?\"      *No Answer*  2. ONSET: \"When did you take your blood pressure?\"      3:30 PM today  3. HOW: \"How did you obtain the blood pressure?\" (e.g., visiting nurse, automatic home BP monitor)      RN at eye appt  4. HISTORY: \"Do you have a history of low blood pressure?\" \"What is your blood pressure normally?\"      No  5. MEDICINES: \"Are you taking any medications for blood pressure?\" If Yes, ask: \"Have they been changed recently?\"      *No Answer*  6. PULSE RATE: \"Do you know what your pulse rate is?\"       *No Answer*  7. OTHER SYMPTOMS: \"Have you been sick recently?\" \"Have you had a recent injury?\"      No  8. PREGNANCY: \"Is there any chance you are pregnant?\" \"When was your last menstrual period?\"      No    Protocols used: Blood Pressure - Low-A-AH    "

## 2024-08-21 ENCOUNTER — TELEPHONE (OUTPATIENT)
Dept: FAMILY MEDICINE | Facility: CLINIC | Age: 84
End: 2024-08-21

## 2024-08-21 PROBLEM — M85.80 OSTEOPENIA: Status: ACTIVE | Noted: 2018-01-23

## 2024-08-21 PROBLEM — D47.2 MONOCLONAL GAMMOPATHY: Status: ACTIVE | Noted: 2023-06-09

## 2024-08-21 PROBLEM — R94.31 PROLONGED QT INTERVAL: Status: ACTIVE | Noted: 2023-08-08

## 2024-08-21 PROBLEM — D64.9 ANEMIA: Status: ACTIVE | Noted: 2023-06-07

## 2024-08-21 PROBLEM — R01.1 MURMUR, HEART: Status: ACTIVE | Noted: 2023-06-07

## 2024-08-21 PROBLEM — D03.9 MELANOMA IN SITU (H): Chronic | Status: ACTIVE | Noted: 2021-11-02

## 2024-08-21 PROBLEM — H91.93 BILATERAL HEARING LOSS: Status: ACTIVE | Noted: 2023-06-07

## 2024-08-21 RX ORDER — TRAZODONE HYDROCHLORIDE 50 MG/1
50 TABLET, FILM COATED ORAL AT BEDTIME
Qty: 90 TABLET | Refills: 3 | COMMUNITY
Start: 2024-08-21 | End: 2024-08-22

## 2024-08-21 NOTE — TELEPHONE ENCOUNTER
Joan's , Blake called to report her BP today is averaging 97-98/57-60.   She is still experiencing some dizziness but he reports she has definitely improved since yesterday.  She has been drinking Gatorade since last night. She did sleep quite well last night.  Scheduled with Dr. Moreau tomorrow at 4:40 PM.    YOSVANY Leal, RN  08/21/24, 2:46 PM

## 2024-08-22 ENCOUNTER — OFFICE VISIT (OUTPATIENT)
Dept: FAMILY MEDICINE | Facility: CLINIC | Age: 84
End: 2024-08-22
Payer: MEDICARE

## 2024-08-22 VITALS
OXYGEN SATURATION: 97 % | DIASTOLIC BLOOD PRESSURE: 72 MMHG | HEART RATE: 66 BPM | TEMPERATURE: 97.8 F | SYSTOLIC BLOOD PRESSURE: 102 MMHG | WEIGHT: 142 LBS | BODY MASS INDEX: 23.63 KG/M2 | RESPIRATION RATE: 14 BRPM

## 2024-08-22 DIAGNOSIS — I89.0 LYMPHEDEMA: ICD-10-CM

## 2024-08-22 DIAGNOSIS — I95.9 HYPOTENSION, UNSPECIFIED HYPOTENSION TYPE: ICD-10-CM

## 2024-08-22 DIAGNOSIS — C44.90 SKIN CANCER: ICD-10-CM

## 2024-08-22 DIAGNOSIS — H81.11 BENIGN PAROXYSMAL POSITIONAL VERTIGO OF RIGHT EAR: Primary | ICD-10-CM

## 2024-08-22 LAB
ANION GAP SERPL CALCULATED.3IONS-SCNC: 13 MMOL/L (ref 7–15)
BUN SERPL-MCNC: 21 MG/DL (ref 8–23)
CALCIUM SERPL-MCNC: 9.2 MG/DL (ref 8.8–10.4)
CHLORIDE SERPL-SCNC: 101 MMOL/L (ref 98–107)
CREAT SERPL-MCNC: 1.3 MG/DL (ref 0.51–0.95)
EGFRCR SERPLBLD CKD-EPI 2021: 41 ML/MIN/1.73M2
ERYTHROCYTE [DISTWIDTH] IN BLOOD BY AUTOMATED COUNT: 13.7 % (ref 10–15)
GLUCOSE SERPL-MCNC: 99 MG/DL (ref 70–99)
HCO3 SERPL-SCNC: 27 MMOL/L (ref 22–29)
HCT VFR BLD AUTO: 36.8 % (ref 35–47)
HGB BLD-MCNC: 11.9 G/DL (ref 11.7–15.7)
MCH RBC QN AUTO: 29.5 PG (ref 26.5–33)
MCHC RBC AUTO-ENTMCNC: 32.3 G/DL (ref 31.5–36.5)
MCV RBC AUTO: 91 FL (ref 78–100)
PLATELET # BLD AUTO: 266 10E3/UL (ref 150–450)
POTASSIUM SERPL-SCNC: 3.3 MMOL/L (ref 3.4–5.3)
RBC # BLD AUTO: 4.04 10E6/UL (ref 3.8–5.2)
SODIUM SERPL-SCNC: 141 MMOL/L (ref 135–145)
TROPONIN T SERPL HS-MCNC: 10 NG/L
WBC # BLD AUTO: 4.4 10E3/UL (ref 4–11)

## 2024-08-22 PROCEDURE — 80048 BASIC METABOLIC PNL TOTAL CA: CPT | Mod: ORL | Performed by: INTERNAL MEDICINE

## 2024-08-22 PROCEDURE — 84484 ASSAY OF TROPONIN QUANT: CPT | Mod: ORL | Performed by: INTERNAL MEDICINE

## 2024-08-22 PROCEDURE — 85027 COMPLETE CBC AUTOMATED: CPT | Mod: ORL | Performed by: INTERNAL MEDICINE

## 2024-08-22 RX ORDER — LOSARTAN POTASSIUM AND HYDROCHLOROTHIAZIDE 12.5; 5 MG/1; MG/1
1 TABLET ORAL
COMMUNITY
Start: 2024-08-22

## 2024-08-22 RX ORDER — LEVOTHYROXINE SODIUM 50 UG/1
TABLET ORAL
COMMUNITY
Start: 2024-08-21 | End: 2024-08-22

## 2024-08-22 RX ORDER — VENLAFAXINE HYDROCHLORIDE 150 MG/1
TABLET, EXTENDED RELEASE ORAL
COMMUNITY
Start: 2024-08-22

## 2024-08-22 RX ORDER — ROSUVASTATIN CALCIUM 5 MG/1
TABLET, COATED ORAL
COMMUNITY
Start: 2024-08-21 | End: 2024-08-22

## 2024-08-22 RX ORDER — LEVOTHYROXINE SODIUM 50 UG/1
TABLET ORAL
COMMUNITY
Start: 2024-08-22

## 2024-08-22 RX ORDER — TRAZODONE HYDROCHLORIDE 50 MG/1
50 TABLET, FILM COATED ORAL AT BEDTIME
COMMUNITY
Start: 2024-08-22

## 2024-08-22 RX ORDER — VENLAFAXINE HYDROCHLORIDE 150 MG/1
TABLET, EXTENDED RELEASE ORAL
Qty: 90 TABLET | Refills: 3 | COMMUNITY
Start: 2024-08-21 | End: 2024-08-22

## 2024-08-22 RX ORDER — MECLIZINE HYDROCHLORIDE 25 MG/1
25 TABLET ORAL 3 TIMES DAILY PRN
Qty: 30 TABLET | Refills: 0 | Status: SHIPPED | OUTPATIENT
Start: 2024-08-22

## 2024-08-22 RX ORDER — ROSUVASTATIN CALCIUM 5 MG/1
TABLET, COATED ORAL
COMMUNITY
Start: 2024-08-22

## 2024-08-22 NOTE — PATIENT INSTRUCTIONS
Benign paroxysmal positional vertigo  Epley maneuver  Cawthorne Cooksey exercises (eye exercise to use if the epley maneuver is not successful, persistent mild symptoms).    If you have persistent symptoms, then contact me for referral to Vestibular physical therapy    [email or web address]/[email or web address]?[email or web address]    Meclizine 25mg up to 3 times a day when you are having vertigo symptoms      Send me name of medication    Check home BP readings and send me blood pressure readings.  
1 pair

## 2024-08-22 NOTE — PROGRESS NOTES
[facility] [address], [address]  [facility] [address]  Phone: [phone]  Fax: [phone]    Patient:  Aretha Kiran, Date of birth 1940  Date of Visit:  08/21/2024  Referring Provider Referred Self      Assessment & Plan    (H81.11) Benign paroxysmal positional vertigo of right ear  (primary encounter diagnosis)  Comment: found to have clinic signs of BPPV at today's visit  Plan: meclizine (ANTIVERT) 25 MG tablet        Epley maneuver was performed today and she had complete resolution of dizziness.  Taught maneuver and gave link to video if needed in the future. If symptoms return in the near future, would refer to vestibular PT. Discussed Cawthorne Cooksey exercises, trial of Meclizine if mild persistent symptoms return.     (I95.9) Hypotension, unspecified hypotension type  Comment: hx of hypertension, treated with medications. She reports hypotensive BP readings, over the past week, upon return from trip to [place]. BP looks normal in clinic today, cardiac ROS negative. Dizziness found to be secondary to underlying vestibular cause. Question in whether she is double dosing BP medication. Review of [facility] indicates she is prescribed combination Losartan-HCTZ (6/2024 with one year of refills). She indicates she is taking BP medication from 2 separate bottles?  Plan: EKG 12-lead complete w/read - Clinics, Basic         metabolic panel        I have asked her to call or send names of all prescription medications via my chart. Indicated she should be taking no more than losartan 50mg and HCTZ 12.5mg daily. Hold on taking any additional doses. Monitor BP readings at home and send readings via My Chart. Encouraged consistent intake of fluids and solids.     (I89.0) Lymphedema  Comment: chronic lymphedema,  Left leg > right. Provider in [facility] recommended particular brand of compression stockings  Plan: Compression Sleeve/Stocking Order for  DME -         ONLY FOR DME        Rx for waist high compression hose 30-40mmHg printed for patient. She may submit to her preferred provider.     (C44.90) Skin cancer  Comment: biopsies done at [facility] [place] [facility] 6/5/24.  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 Lesions hakan, indicating underlying vascular tissue.   Printed pathology report for the patient and encouraged her to bring report to her dermatologist in [place] in September.    89 minutes spend on the date of this encounter doing chart review, history and exam, documentation and further activities as noted above.       Shelby Moreau MD         Aretha Kiran is a 84 year old female with history of HTN, hyperlipidemia, CKD 3b, low B12, normocytic anemia (10.4), mild tricuspid valve regurgitation, dysthymia, ELAINE (CPAP), imbalance, GERD, colon polyps, chronic diarrhea, constipation, osteopenia, history of pulmonary embolism, lymphedema, migraine, pancreatic abnormality, hypothyroidism and dysthymia.  She gets the lion share of her care at [facility] [place] [facility] but is here with her spouse, to discuss the following issues :       Dizziness  Last clinic visit 6/2723, receives most of her care at [facility]  Recent travel to [place] mid July. 8500 ft, stayed 5-6 wks  One week into visit had some dizziness, mild  Returned home 3 d ago and noted worsening dizziness with looking down  Hx of vertigo, reports it is hard to keep her balance  Nausea, no vomiting  Persistent daily episodes  2 days ago, after having eye injection for macular degeneration, had significant bout of dizziness. Knelt on the floor and laid head on the bed.       Hypotension  Hx of hypertension, on losartan 50mg + HCTZ 12.5mg dose  This past week, she has experienced dizziness and associated low blood pressure.  SBP reading 95 at eye clinic,  "low 90s on home monitor   Typical home BP reading is 120/70  No near syncope, chest tightness or shortness of breath, no palpitations. She has chronic lower extremity edema, unchanged, see below  Review of [facility] [place] [facility] records shows Rx for combination tablet though she is taking BP medication from separate pill bottles. Unclear if she is \"double dosing\"?  Over the past week, eating and fluid intake has been erratic (per spouse), no vomiting  Recent ECHO at [place],EF 63%, grade 1 diastolic dysfunction, normal LV/RV, normal atria, trivial aortic regurg, mildly thickened MV, trivial PV regurg, mild to moderate tricuspid valve regurgitation.     BP Readings from Last 3 Encounters:   08/22/24 102/72   06/27/23 106/67   05/13/21 118/50       Lymphedema  Hx of chronic, asymmetric leg edema, L>R  Was working with lymphedema specialist in [facility] who recommended particular brand of support hose but did not write Rx  She is asking me to write Rx for waist high 30-40mm Hg hose, 4 pair  She plans to go to [facility] for consultation with lymphedema specialist.     Dysthymia  Lots of situational stress, has home in [facility] with lots of structural issues, requiring a lot of work, financially draining  Currently prescribed Venlafaxine 225mg daily + trazodone 50mg q hs by psychiatrist in [place] ([facility])  Has therapist in [place] that cannot offer visits outside of [place]  Last seen in May 2024. She plans to return to [place] in November 2024. No current therapist.     Skin cancer  Previous hx of skin cancer. 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 No evidence for recurrence 6/5/24.  Recent Skin biopsy x 2 at upper and lower left shin 6/5/24 at [facility]   She was unsure about pathology report and/ or whether follow up was indicated  She has a dermatologist in [place] and made appt " for mid September  Currently applying vaseline and covering with bandage daily  Concerned that lesions look purple, no pain. No drainage or bleeding  Pathology report 6/5/24  FINAL DIAGNOSIS   A.  Left Lower Leg - Anterior, proximal, Skin shave biopsy:    Superficial basal cell carcinoma, borders appear free of   tumor     COMMENT   Absence of tumor at borders in sections from a shave or   punch specimen does not necessarily indicate that the   lesion has been completely removed.  Clinical and   pathological correlation is recommended to determine   adequacy of removal.       B.  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 with   background actinic keratosis, involving biopsy borders         Patient Active Problem List   Diagnosis    History of pulmonary embolism    Concussion    Dysthymia    History of colonic polyps    Lymphedema    Migraine    Pancreatic abnormality    Sleep apnea    Esophageal reflux    Diarrhea, unspecified type    Stage 3b chronic kidney disease (H)    Seasonal allergic rhinitis, unspecified trigger    Situational stress       Current Outpatient Medications   Medication Sig Dispense Refill    amoxicillin (AMOXIL) 500 MG capsule Take 4 capsules by mouth as needed Take 4 capsules one hour prior to dental visit      aspirin 81 MG EC tablet Take 81 mg by mouth daily (Patient not taking: Reported on 6/27/2023)      calcium 600 MG tablet Take 1 tablet (600 mg) by mouth 2 times daily 60 tablet     ciprofloxacin (CIPRO) 250 MG tablet Take 1 tablet (250 mg) by mouth 2 times daily 14 tablet 0    hydrochlorothiazide (HYDRODIURIL) 25 MG tablet Take 25 mg by mouth daily      ipratropium (ATROVENT) 0.06 % nasal spray Spray 2 sprays into both nostrils 4 times daily      levothyroxine (SYNTHROID/LEVOTHROID) 50 MCG tablet Take 50 mcg by mouth      losartan (COZAAR) 25 MG tablet Take 25 mg by mouth daily      losartan-hydrochlorothiazide (HYZAAR) 50-12.5  MG tablet Take 1 tablet by mouth daily at 2 pm      rosuvastatin (CRESTOR) 5 MG tablet       simvastatin (ZOCOR) 20 MG tablet Take 1 tablet (20 mg) by mouth At Bedtime (Patient not taking: Reported on 3/14/2024) 90 tablet 3    traZODone (DESYREL) 100 MG tablet Take 1 tablet by mouth nightly as needed Take 0.5 to 1 po q hs      venlafaxine (EFFEXOR-ER) 150 MG TB24 Take 150 mg by mouth daily (with breakfast)         Allergies   Allergen Reactions    Codeine Nausea and Vomiting    Zoster Vaccine Live      redness, warm, itchy, hard lump, and soreness x 1 1/2 weeks.          EXAM  /72 (BP Location: Left arm, Patient Position: Sitting, Cuff Size: Adult Regular)   Pulse 66   Temp 97.8  F (36.6  C) (Skin)   Resp 14   Wt 64.4 kg (142 lb)   LMP 01/01/1985   SpO2 97%   BMI 23.63 kg/m    Gen: Alert, pleasant, NAD  COR: S1,S2, no murmur or ectopy  Lungs: CTA bilaterally, no rhonchi, wheezes or rales  Ext: asymmetric lower extremity edema, left ankle with +1 edema, no redness or warmth  Skin: two distinct dime sized purple lesions on left anterior shin, flat, both hakan,nontender, no surrounding redness, no weeping  2 faint red smaller patches, medial to the above lesions, also hakan with pressure.   Neuro: unable to elicit horizontal nystagmus during exam but she reports dizziness with this maneuver, has some gait imbalance with moving from the chair to the exam table    EKG  August 22, 2024, Normal sinus rhythm, rate 66, normal intervals, QTc 442, no ST-T changes. With comparison to [facility] EKG dated 6/4/24, QTc interval has shortened, improved. Read by Shelby Moreau MD    Procedure ----Epley maneuver  + nystagmus noted during the maneuver. Symptoms completely extinguished upon completing maneuver.  She is pleased with outcome

## 2024-08-22 NOTE — NURSING NOTE
Joan  84 year old    Chief Complaint   Patient presents with    Dizziness     Was in [place] for weeks, dizziness, fatigue, near fainting, and hypotension came on a week after arriving, chalked up to altitude, but symptoms have continued since coming home.             Blood pressure 102/72, pulse 66, temperature 97.8  F (36.6  C), temperature source Skin, resp. rate 14, weight 64.4 kg (142 lb), last menstrual period 01/01/1985, SpO2 97%. Body mass index is 23.63 kg/m .    Patient Active Problem List   Diagnosis    History of pulmonary embolism    Concussion    Dysthymia    History of colonic polyps    Lymphedema    Migraine    Sleep apnea    Esophageal reflux    Diarrhea, unspecified type    Stage 3b chronic kidney disease (H)    Seasonal allergic rhinitis, unspecified trigger    Situational stress    Anemia    Benign paroxysmal positional vertigo    Bilateral hearing loss    Hyperlipidemia    Hypothyroidism    Insomnia    Intraductal papillary mucinous adenoma of pancreas    Macular degeneration    Melanoma in situ (H)    Monoclonal gammopathy    Murmur, heart    Osteoarthritis of knee    Osteoarthritis of cervical spine    Osteopenia    Prolonged QT interval              Wt Readings from Last 2 Encounters:   08/22/24 64.4 kg (142 lb)   06/27/23 64.9 kg (143 lb)       BP Readings from Last 3 Encounters:   08/22/24 102/72   06/27/23 106/67   05/13/21 118/50                Current Outpatient Medications   Medication Sig Dispense Refill    amoxicillin (AMOXIL) 500 MG capsule Take 4 capsules by mouth as needed Take 4 capsules one hour prior to dental visit      calcium 600 MG tablet Take 1 tablet (600 mg) by mouth 2 times daily 60 tablet     ipratropium (ATROVENT) 0.06 % nasal spray Spray 2 sprays into both nostrils 4 times daily      levothyroxine (SYNTHROID/LEVOTHROID) 50 MCG tablet Take one daily, RX from [facility] endocrinologist      losartan-hydrochlorothiazide (HYZAAR) 50-12.5 MG tablet Take 1 tablet by mouth daily  "at 2 pm      Multiple Vitamins-Minerals (PRESERVISION AREDS 2 PO) Take by mouth.      rosuvastatin (CRESTOR) 5 MG tablet Take 5mg daily, rx from [facility] cardiologist      traZODone (DESYREL) 50 MG tablet Take 1 tablet (50 mg) by mouth at bedtime. Take 50mg po q hs, rx from outsider provider 90 tablet 3    venlafaxine (EFFEXOR-ER) 150 MG 24 hr tablet Take 225mg daily, RX from outside psychiatrist/provider 90 tablet 3    aspirin 81 MG EC tablet Take 81 mg by mouth daily (Patient not taking: Reported on 2023)       No current facility-administered medications for this visit.              Social History     Tobacco Use    Smoking status: Former     Current packs/day: 0.00     Types: Cigarettes     Quit date: 1982     Years since quittin.6    Smokeless tobacco: Never   Vaping Use    Vaping status: Never Used   Substance Use Topics    Alcohol use: No    Drug use: No              Health Maintenance Due   Topic Date Due    DEXA  Never done    LIPID  Never done    MICROALBUMIN  Never done    PARATHYROID  Never done    PHOSPHORUS  Never done    TSH W/FREE T4 REFLEX  Never done    ADVANCE CARE PLANNING  Never done    DEPRESSION ACTION PLAN  Never done    RSV VACCINE (Pregnancy & 60+) (1 - 1-dose 60+ series) Never done    MEDICARE ANNUAL WELLNESS VISIT  Never done    HEMOGLOBIN  2015    BMP  2018    FALL RISK ASSESSMENT  2020    PHQ-9  2024            No results found for: \"PAP\"           2024 4:47 PM    "

## 2024-08-23 PROBLEM — C44.90 SKIN CANCER: Status: ACTIVE | Noted: 2024-08-23

## 2024-08-27 ENCOUNTER — TELEPHONE (OUTPATIENT)
Dept: FAMILY MEDICINE | Facility: CLINIC | Age: 84
End: 2024-08-27

## 2024-08-27 NOTE — TELEPHONE ENCOUNTER
Joan's , Blake, called to report that Joan had a possible panic attack at lunch time. She suddenly became frightened and confused and didn't know what to do or where to go. He said it took him about an hour to calm her down.   [facility] is now complaining of a severe headache and is rubbing her head and saying she can't feel her arms or legs.  Advised getting her to the ER ASAP and to call 911 or ask a worker at the airport for help.    YOSVANY Leal, RN  08/27/24, 1:40 PM
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
## 2024-08-28 ENCOUNTER — OFFICE VISIT (OUTPATIENT)
Dept: FAMILY MEDICINE | Facility: CLINIC | Age: 84
End: 2024-08-28
Payer: MEDICARE

## 2024-08-28 VITALS
WEIGHT: 142 LBS | SYSTOLIC BLOOD PRESSURE: 128 MMHG | DIASTOLIC BLOOD PRESSURE: 82 MMHG | OXYGEN SATURATION: 95 % | BODY MASS INDEX: 23.66 KG/M2 | TEMPERATURE: 98 F | HEIGHT: 65 IN | HEART RATE: 64 BPM | RESPIRATION RATE: 14 BRPM

## 2024-08-28 DIAGNOSIS — F43.29 POST-TRAUMA RESPONSE: Primary | ICD-10-CM

## 2024-08-28 DIAGNOSIS — H81.11 BENIGN PAROXYSMAL POSITIONAL VERTIGO, RIGHT: ICD-10-CM

## 2024-08-28 DIAGNOSIS — F32.9 REACTIVE DEPRESSION: ICD-10-CM

## 2024-08-28 DIAGNOSIS — J34.89 RHINORRHEA: ICD-10-CM

## 2024-08-28 DIAGNOSIS — I89.0 LYMPHEDEMA: ICD-10-CM
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
## 2024-08-28 NOTE — NURSING NOTE
"84 year old  Chief Complaint   Patient presents with    Follow Up     Follow up on call from yesterday, did not end up going to ER.        Blood pressure (!) 147/72, pulse 64, temperature 98  F (36.7  C), temperature source Skin, resp. rate 14, height 1.651 m (5' 5\"), weight 64.4 kg (142 lb), last menstrual period 01/01/1985, SpO2 95%. Body mass index is 23.63 kg/m .  Patient Active Problem List   Diagnosis    History of pulmonary embolism    Concussion    Dysthymia    History of colonic polyps    Lymphedema    Migraine    Sleep apnea    Esophageal reflux    Diarrhea, unspecified type    Stage 3b chronic kidney disease (H)    Seasonal allergic rhinitis, unspecified trigger    Situational stress    Anemia    Benign paroxysmal positional vertigo    Bilateral hearing loss    Hyperlipidemia    Hypothyroidism    Insomnia    Intraductal papillary mucinous adenoma of pancreas    Macular degeneration    Melanoma in situ (H)    Monoclonal gammopathy    Murmur, heart    Osteoarthritis of knee    Osteoarthritis of cervical spine    Osteopenia    Prolonged QT interval    Skin cancer       Wt Readings from Last 2 Encounters:   08/28/24 64.4 kg (142 lb)   08/22/24 64.4 kg (142 lb)     BP Readings from Last 3 Encounters:   08/28/24 (!) 147/72   08/22/24 102/72   06/27/23 106/67         Current Outpatient Medications   Medication Sig Dispense Refill    amoxicillin (AMOXIL) 500 MG capsule Take 4 capsules by mouth as needed Take 4 capsules one hour prior to dental visit      calcium 600 MG tablet Take 1 tablet (600 mg) by mouth 2 times daily 60 tablet     ipratropium (ATROVENT) 0.06 % nasal spray Spray 2 sprays into both nostrils 4 times daily      levothyroxine (SYNTHROID/LEVOTHROID) 50 MCG tablet Take one daily, RX from [place] endocrinologist      losartan-hydrochlorothiazide (HYZAAR) 50-12.5 MG tablet Take 1 tablet by mouth daily at 2 pm.      meclizine (ANTIVERT) 25 MG tablet Take 1 tablet (25 mg) by mouth 3 times daily as needed " "for dizziness. 30 tablet 0    Multiple Vitamins-Minerals (PRESERVISION AREDS 2 PO) Take by mouth.      rosuvastatin (CRESTOR) 5 MG tablet Take 5mg daily, rx from [place] cardiologist      traZODone (DESYREL) 50 MG tablet Take 1 tablet (50 mg) by mouth at bedtime. Take 50mg po q hs, rx from outsider provider      venlafaxine (EFFEXOR-ER) 150 MG 24 hr tablet Take 225mg daily, RX from outside psychiatrist/provider      aspirin 81 MG EC tablet Take 81 mg by mouth daily (Patient not taking: Reported on 2023)       No current facility-administered medications for this visit.       Social History     Tobacco Use    Smoking status: Former     Current packs/day: 0.00     Types: Cigarettes     Quit date: 1982     Years since quittin.6    Smokeless tobacco: Never   Vaping Use    Vaping status: Never Used   Substance Use Topics    Alcohol use: No    Drug use: No       Health Maintenance Due   Topic Date Due    DEXA  Never done    LIPID  Never done    MICROALBUMIN  Never done    PARATHYROID  Never done    PHOSPHORUS  Never done    TSH W/FREE T4 REFLEX  Never done    ADVANCE CARE PLANNING  Never done    DEPRESSION ACTION PLAN  Never done    RSV VACCINE (Pregnancy & 60+) (1 - 1-dose 60+ series) Never done    MEDICARE ANNUAL WELLNESS VISIT  Never done    FALL RISK ASSESSMENT  2020    PHQ-9  2024       No results found for: \"PAP\"      2024 11:06 AM   "

## 2024-09-03 ENCOUNTER — OFFICE VISIT (OUTPATIENT)
Dept: FAMILY MEDICINE | Facility: CLINIC | Age: 84
End: 2024-09-03
Payer: MEDICARE

## 2024-09-03 VITALS
DIASTOLIC BLOOD PRESSURE: 70 MMHG | OXYGEN SATURATION: 95 % | SYSTOLIC BLOOD PRESSURE: 121 MMHG | BODY MASS INDEX: 28.86 KG/M2 | RESPIRATION RATE: 14 BRPM | TEMPERATURE: 98 F | HEIGHT: 60 IN | HEART RATE: 78 BPM | WEIGHT: 147 LBS

## 2024-09-03 DIAGNOSIS — Z01.818 PREOP GENERAL PHYSICAL EXAM: Primary | ICD-10-CM

## 2024-09-03 DIAGNOSIS — I10 BENIGN ESSENTIAL HYPERTENSION: ICD-10-CM

## 2024-09-03 DIAGNOSIS — H26.8 OTHER CATARACT OF BOTH EYES: ICD-10-CM

## 2024-09-03 DIAGNOSIS — N18.32 STAGE 3B CHRONIC KIDNEY DISEASE (H): ICD-10-CM

## 2024-09-03 RX ORDER — CETIRIZINE HYDROCHLORIDE 10 MG/1
1 TABLET ORAL DAILY
COMMUNITY

## 2024-09-03 NOTE — PATIENT INSTRUCTIONS
How to Take Your Medication Before Surgery  Preoperative Medication Instructions     How to Take Your Medication Before Surgery  Do not take any medications on the morning of your surgery  Resume all medications after your surgery           Patient Education   Preparing for Your Surgery  Getting started  A nurse will call you to review your health history and instructions. They will give you an arrival time based on your scheduled surgery time. Please be ready to share:  Your doctor's clinic name and phone number  Your medical, surgical, and anesthesia history  A list of allergies and sensitivities  A list of medicines, including herbal treatments and over-the-counter drugs  Whether the patient has a legal guardian (ask how to send us the papers in advance)  Please tell us if you're pregnant--or if there's any chance you might be pregnant. Some surgeries may injure a fetus (unborn baby), so they require a pregnancy test. Surgeries that are safe for a fetus don't always need a test, and you can choose whether to have one.   If you have a child who's having surgery, please ask for a copy of Preparing for Your Child's Surgery.    Preparing for surgery  Within 10 to 30 days of surgery: Have a pre-op exam (sometimes called an H&P, or History and Physical). This can be done at a clinic or pre-operative center.  If you're having a , you may not need this exam. Talk to your care team.  At your pre-op exam, talk to your care team about all medicines you take. If you need to stop any medicines before surgery, ask when to start taking them again.  We do this for your safety. Many medicines can make you bleed too much during surgery. Some change how well surgery (anesthesia) drugs work.  Call your insurance company to let them know you're having surgery. (If you don't have insurance, call [phone].)  Call your clinic if there's any change in your health. This includes signs of a cold or flu (sore throat, runny  nose, cough, rash, fever). It also includes a scrape or scratch near the surgery site.  If you have questions on the day of surgery, call your hospital or surgery center.  Eating and drinking guidelines  For your safety: Unless your surgeon tells you otherwise, follow the guidelines below.  Eat and drink as usual until 8 hours before you arrive for surgery. After that, no food or milk.  Drink clear liquids until 2 hours before you arrive. These are liquids you can see through, like water, Gatorade, and Propel Water. They also include plain black coffee and tea (no cream or milk), candy, and breath mints. You can spit out gum when you arrive.  If you drink alcohol: Stop drinking it the night before surgery.  If your care team tells you to take medicine on the morning of surgery, it's okay to take it with a sip of water.  Preventing infection  Shower or bathe the night before and morning of your surgery. Follow the instructions your clinic gave you. (If no instructions, use regular soap.)  Don't shave or clip hair near your surgery site. We'll remove the hair if needed.  Don't smoke or vape the morning of surgery. You may chew nicotine gum up to 2 hours before surgery. A nicotine patch is okay.  Note: Some surgeries require you to completely quit smoking and nicotine. Check with your surgeon.  Your care team will make every effort to keep you safe from infection. We will:  Clean our hands often with soap and water (or an alcohol-based hand rub).  Clean the skin at your surgery site with a special soap that kills germs.  Give you a special gown to keep you warm. (Cold raises the risk of infection.)  Wear special hair covers, masks, gowns and gloves during surgery.  Give antibiotic medicine, if prescribed. Not all surgeries need antibiotics.  What to bring on the day of surgery  Photo ID and insurance card  Copy of your health care directive, if you have one  Glasses and hearing aids (bring cases)  You can't wear  contacts during surgery  Inhaler and eye drops, if you use them (tell us about these when you arrive)  CPAP machine or breathing device, if you use them  A few personal items, if spending the night  If you have . . .  A pacemaker, ICD (cardiac defibrillator) or other implant: Bring the ID card.  An implanted stimulator: Bring the remote control.  A legal guardian: Bring a copy of the certified (court-stamped) guardianship papers.  Please remove any jewelry, including body piercings. Leave jewelry and other valuables at home.  If you're going home the day of surgery  You must have a responsible adult drive you home. They should stay with you overnight as well.  If you don't have someone to stay with you, and you aren't safe to go home alone, we may keep you overnight. Insurance often won't pay for this.  After surgery  If it's hard to control your pain or you need more pain medicine, please call your surgeon's office.  Questions?   If you have any questions for your care team, list them here: _________________________________________________________________________________________________________________________________________________________________________ ____________________________________ ____________________________________ ____________________________________  For informational purposes only. Not to replace the advice of your health care provider. Copyright   2003, 2019 [facility]. All rights reserved. Clinically reviewed by Nhung Vang MD. [employer] [id number] - REV 12/22.

## 2024-09-03 NOTE — PROGRESS NOTES
Preoperative Evaluation  [facility] [address], [address]  [facility] [address]  Phone: [phone]  Fax: [phone]  Primary Provider: Shelby Moreau MD  Pre-op Performing Provider: Shelby Moreau MD  Sep 3, 2024           9/3/2024   Surgical Information   What procedure is being done? Cataract Surgery   Facility or Hospital where procedure/surgery will be performed: [facility]   Who is doing the procedure / surgery? [place]   Date of surgery / procedure: 9/16 and 10/8   Time of surgery / procedure: TBD   Where do you plan to recover after surgery? at home with family      Fax number for surgical facility: [phone]    Assessment & Plan     The proposed surgical procedure is considered LOW risk.  (Z01.818) Preop general physical exam  (primary encounter diagnosis)  (H26.8) Other cataract of both eyes  Comment: bilateral cataracts, undergoing elective cataract extractions  Plan: no contraindication to surgery, proceed as planned  NPO on morning of surgery    (I10) Benign essential hypertension  Comment: BP in excellent control  BP Readings from Last 3 Encounters:   09/03/24 121/70   08/28/24 128/82   08/22/24 102/72   Plan: hold losartan-HCTZ 50-12.5mg dose on morning of surgery, take after returning home.     (N18.32) Stage 3b chronic kidney disease (H)  Comment: GFR 40-45, stable for the past 10 yrs  Plan: renal dose all systemic medication. Kidney disease is stable, no concerns about proceeding with surgery.     Sleep Apnea: uses CPAP {      - No identified additional risk factors other than previously addressed    Preoperative Medication Instructions    How to Take Your Medication Before Surgery  Do not take any medications on the morning of your surgery  Resume all medications after you return from your surgery      Recommendation  Approval given to proceed with proposed procedure, without further diagnostic  evaluation.    Shelby Moreau MD  Internal Medicine/Pediatrics      Bren Franco is a 84 year old, presenting for the following:  Pre-Op Exam        HPI related to upcoming procedure:  Aretha Kiran is a 84 year old female with history of HTN, hyperlipidemia, CKD 3b, low B12, normocytic anemia (10.4), mild tricuspid valve regurgitation, dysthymia, ELAINE (CPAP), imbalance, GERD, colon polyps, chronic diarrhea, constipation, osteopenia, history of pulmonary embolism, lymphedema, migraine, pancreatic abnormality, hypothyroidism and dysthymia.  She presents today for preoperative evaluation. She wishes to undergo elective cataract extractions.          9/3/2024   Pre-Op Questionnaire   Have you ever had a heart attack or stroke? No   Have you ever had surgery on your heart or blood vessels, such as a stent placement, a coronary artery bypass, or surgery on an artery in your head, neck, heart, or legs? No   Do you have chest pain with activity? No   Do you have a history of heart failure? No   Do you currently have a cold, bronchitis or symptoms of other infection? No   Do you have a cough, shortness of breath, or wheezing? No   Do you or anyone in your family have previous history of blood clots? (!) YES personal hx of lung clot after fall, while on HRT   Do you or does anyone in your family have a serious bleeding problem such as prolonged bleeding following surgeries or cuts? No   Have you ever had problems with anemia or been told to take iron pills? No   Have you had any abnormal blood loss such as black, tarry or bloody stools, or abnormal vaginal bleeding? No   Have you ever had a blood transfusion? No   Are you willing to have a blood transfusion if it is medically needed before, during, or after your surgery? Yes   Have you or any of your relatives ever had problems with anesthesia? No   Do you have sleep apnea, excessive snoring or daytime drowsiness? (!) YES--uses CPAP   Do you have a CPAP machine? Yes   Do  you have any artifical heart valves or other implanted medical devices like a pacemaker, defibrillator, or continuous glucose monitor? No   Do you have artificial joints? (!) YES   Are you allergic to latex? No      Health Care Directive  Patient does not have a Health Care Directive or Living Will: she has health care directive at home    Preoperative Review of    reviewed - no record of controlled substances prescribed.      HTN  Losartan-HCTZ 50-12.5mg daily  CKD 3b, stable, Cr 1.3, GFR 41,. No change over the past 10 yr.  No hx of stroke or coronary artery disease.  BP Readings from Last 3 Encounters:   09/03/24 121/70   08/28/24 128/82   08/22/24 102/72       Patient Active Problem List    Diagnosis Date Noted    Skin cancer 08/23/2024     Priority: Medium     biopsies done at [facility]. She has follow up appt with dermatologist in [place] 9/16      Prolonged QT interval 08/08/2023     Priority: Medium    Stage 3b chronic kidney disease (H) 06/27/2023     Priority: Medium    Seasonal allergic rhinitis, unspecified trigger 06/27/2023     Priority: Medium    Situational stress 06/27/2023     Priority: Medium    Monoclonal gammopathy 06/09/2023     Priority: Medium    Anemia 06/07/2023     Priority: Medium    Bilateral hearing loss 06/07/2023     Priority: Medium    Murmur, heart 06/07/2023     Priority: Medium    Melanoma in situ (H) 11/02/2021     Priority: Medium     Formatting of this note might be different from the original.   Managed by Dr. Jones - yearly   Malignant melanoma type and depth: ATYPICAL JUNCTIONAL MELANOCYTIC PROLIFERATION ON SUN DAMAGED SKIN ( EARLY EVOLVING MELANOMA IN SITU) 12/17/19  ([id number])    Malignant melanoma location: left mid bicep    Malignant melanoma date of excision and name of surgeon: Dr. Jones 1/2020    [place] nodes were not tested        Last Assessment & Plan:    Formatting of this note might be different from the original.   09/20/22   PLAN:    - Continue skin exams  regularly as we have discussed, yearly.       I do not see any signs of recurrence of your prior skin cancer on left mid bicep.    Lymph node exam: N/A      Continue to monitor for any new or unusual skin lesions, and return to clinic for a recheck of any concerning spots prior to your next planned office visit. Continue to practice good sun protection.      Diarrhea, unspecified type 06/02/2020     Priority: Medium    Osteopenia 01/23/2018     Priority: Medium    Hypothyroidism 06/08/2016     Priority: Medium    Osteoarthritis of knee 05/17/2016     Priority: Medium    Esophageal reflux 12/06/2014     Priority: Medium    Dysthymia 12/01/2014     Priority: Medium    History of colonic polyps 12/01/2014     Priority: Medium     Last colonoscopy with removal of 3 polyps. Due 5/2015 for next endoscopy. Last one done at [facility].           Lymphedema 12/01/2014     Priority: Medium     Followed at [facility].  Wears support hose, uses massage.       Migraine 12/01/2014     Priority: Medium     Follows with Dr. Katie Nathan.       Sleep apnea 12/01/2014     Priority: Medium     Wears CPAP mask      History of pulmonary embolism 05/01/2014     Priority: Medium     HRT is stopped      Concussion 01/01/2014     Priority: Medium    Macular degeneration 10/08/2013     Priority: Medium     Formatting of this note might be different from the original.   Bilateral      Osteoarthritis of cervical spine 05/06/2013     Priority: Medium    Insomnia 12/06/2011     Priority: Medium    Intraductal papillary mucinous adenoma of pancreas 12/16/2005     Priority: Medium    Hyperlipidemia 11/11/2005     Priority: Medium    Benign paroxysmal positional vertigo 05/21/2003     Priority: Medium      Past Medical History:   Diagnosis Date    Orbital floor fracture (H)      Past Surgical History:   Procedure Laterality Date    CHOLECYSTECTOMY      ORBITOTOMY  03/2017     Current Outpatient Medications   Medication Sig Dispense Refill    amoxicillin  (AMOXIL) 500 MG capsule Take 4 capsules by mouth as needed Take 4 capsules one hour prior to dental visit      calcium 600 MG tablet Take 1 tablet (600 mg) by mouth 2 times daily 60 tablet     cetirizine (ZYRTEC) 10 MG tablet Take 1 tablet by mouth daily.      ipratropium (ATROVENT) 0.06 % nasal spray Spray 2 sprays into both nostrils 4 times daily      levothyroxine (SYNTHROID/LEVOTHROID) 50 MCG tablet Take one daily, RX from [place] endocrinologist      losartan-hydrochlorothiazide (HYZAAR) 50-12.5 MG tablet Take 1 tablet by mouth daily at 2 pm.      Multiple Vitamins-Minerals (PRESERVISION AREDS 2 PO) Take by mouth.      rosuvastatin (CRESTOR) 5 MG tablet Take 5mg daily, rx from [place] cardiologist      traZODone (DESYREL) 50 MG tablet Take 1 tablet (50 mg) by mouth at bedtime. Take 50mg po q hs, rx from outsider provider      venlafaxine (EFFEXOR-ER) 150 MG 24 hr tablet Take 225mg daily, RX from outside psychiatrist/provider      aspirin 81 MG EC tablet Take 81 mg by mouth daily (Patient not taking: Reported on 2023)      meclizine (ANTIVERT) 25 MG tablet Take 1 tablet (25 mg) by mouth 3 times daily as needed for dizziness. (Patient not taking: Reported on 9/3/2024) 30 tablet 0       Allergies   Allergen Reactions    Codeine Nausea and Vomiting    Zoster Vaccine Live      redness, warm, itchy, hard lump, and soreness x 1 1/2 weeks.          Social History     Tobacco Use    Smoking status: Former     Current packs/day: 0.00     Types: Cigarettes     Quit date: 1982     Years since quittin.7    Smokeless tobacco: Never   Substance Use Topics    Alcohol use: No     Family History   Problem Relation Age of Onset    Dementia Mother     Heart Disease Father     Cerebrovascular Disease Father         TIAs    Heart Disease Paternal Grandfather     Breast Cancer Maternal Aunt         x2    Cancer Paternal Aunt         uterine     History   Drug Use No             Review of Systems  Constitutional, neuro, ENT,  endocrine, pulmonary, cardiac, gastrointestinal, genitourinary, musculoskeletal, integument and psychiatric systems are negative, except as otherwise noted.    Objective    /70 (BP Location: Right arm, Patient Position: Sitting, Cuff Size: Adult Regular)   Pulse 78   Temp 98  F (36.7  C) (Skin)   Resp 14   Ht 1.524 m (5')   Wt 66.7 kg (147 lb)   LMP 01/01/1985   SpO2 95%   BMI 28.71 kg/m     Estimated body mass index is 28.71 kg/m  as calculated from the following:    Height as of this encounter: 1.524 m (5').    Weight as of this encounter: 66.7 kg (147 lb).  Physical Exam  GENERAL: alert and no distress  EYES: Eyes grossly normal to inspection, PERRL and conjunctivae and sclerae normal  HENT: ear canals and TM's normal, nose and mouth without ulcers or lesions  NECK: no adenopathy, no asymmetry, masses, or scars  RESP: lungs clear to auscultation - no rales, rhonchi or wheezes  CV: regular rate and rhythm, normal S1 S2, no murmur  ABDOMEN: soft, nontender,  bowel sounds normal  MS: no gross musculoskeletal defects noted, no edema  SKIN: no suspicious lesions or rashes  NEURO: Normal strength and tone, mentation intact and speech normal  PSYCH: mentation appears normal, affect normal/bright  EXT: lower extremity + edema (chronic, wearing compression hose).     Recent Labs   Lab Test 08/22/24  1720   HGB 11.9         POTASSIUM 3.3*   CR 1.30*        Diagnostics  No labs were ordered during this visit.   No EKG required for low risk surgery (cataract, skin procedure, breast biopsy, etc).    Revised Cardiac Risk Index (RCRI)  The patient has the following serious cardiovascular risks for perioperative complications:   - No serious cardiac risks = 0 points     RCRI Interpretation: 0 points: Class I (very low risk - 0.4% complication rate)    Signed Electronically by: Shebly Moreau MD  A copy of this evaluation report is provided to the requesting physician.

## 2024-09-03 NOTE — NURSING NOTE
84 year old  No chief complaint on file.      Blood pressure 121/70, pulse 78, temperature 98  F (36.7  C), temperature source Skin, resp. rate 14, height 1.524 m (5'), weight 66.7 kg (147 lb), last menstrual period 01/01/1985, SpO2 95%. Body mass index is 28.71 kg/m .  Patient Active Problem List   Diagnosis    History of pulmonary embolism    Concussion    Dysthymia    History of colonic polyps    Lymphedema    Migraine    Sleep apnea    Esophageal reflux    Diarrhea, unspecified type    Stage 3b chronic kidney disease (H)    Seasonal allergic rhinitis, unspecified trigger    Situational stress    Anemia    Benign paroxysmal positional vertigo    Bilateral hearing loss    Hyperlipidemia    Hypothyroidism    Insomnia    Intraductal papillary mucinous adenoma of pancreas    Macular degeneration    Melanoma in situ (H)    Monoclonal gammopathy    Murmur, heart    Osteoarthritis of knee    Osteoarthritis of cervical spine    Osteopenia    Prolonged QT interval    Skin cancer       Wt Readings from Last 2 Encounters:   09/03/24 66.7 kg (147 lb)   08/28/24 64.4 kg (142 lb)     BP Readings from Last 3 Encounters:   09/03/24 121/70   08/28/24 128/82   08/22/24 102/72         Current Outpatient Medications   Medication Sig Dispense Refill    amoxicillin (AMOXIL) 500 MG capsule Take 4 capsules by mouth as needed Take 4 capsules one hour prior to dental visit      calcium 600 MG tablet Take 1 tablet (600 mg) by mouth 2 times daily 60 tablet     cetirizine (ZYRTEC) 10 MG tablet Take 1 tablet by mouth daily.      ipratropium (ATROVENT) 0.06 % nasal spray Spray 2 sprays into both nostrils 4 times daily      levothyroxine (SYNTHROID/LEVOTHROID) 50 MCG tablet Take one daily, RX from [place] endocrinologist      losartan-hydrochlorothiazide (HYZAAR) 50-12.5 MG tablet Take 1 tablet by mouth daily at 2 pm.      Multiple Vitamins-Minerals (PRESERVISION AREDS 2 PO) Take by mouth.      rosuvastatin (CRESTOR) 5 MG tablet Take 5mg daily, rx  "from [place] cardiologist      traZODone (DESYREL) 50 MG tablet Take 1 tablet (50 mg) by mouth at bedtime. Take 50mg po q hs, rx from outsider provider      venlafaxine (EFFEXOR-ER) 150 MG 24 hr tablet Take 225mg daily, RX from outside psychiatrist/provider      aspirin 81 MG EC tablet Take 81 mg by mouth daily (Patient not taking: Reported on 2023)      meclizine (ANTIVERT) 25 MG tablet Take 1 tablet (25 mg) by mouth 3 times daily as needed for dizziness. (Patient not taking: Reported on 9/3/2024) 30 tablet 0     No current facility-administered medications for this visit.       Social History     Tobacco Use    Smoking status: Former     Current packs/day: 0.00     Types: Cigarettes     Quit date: 1982     Years since quittin.7    Smokeless tobacco: Never   Vaping Use    Vaping status: Never Used   Substance Use Topics    Alcohol use: No    Drug use: No       Health Maintenance Due   Topic Date Due    DEXA  Never done    LIPID  Never done    MICROALBUMIN  Never done    PARATHYROID  Never done    PHOSPHORUS  Never done    TSH W/FREE T4 REFLEX  Never done    DEPRESSION ACTION PLAN  Never done    RSV VACCINE (1 - 1-dose 60+ series) Never done    MEDICARE ANNUAL WELLNESS VISIT  Never done    FALL RISK ASSESSMENT  2020    INFLUENZA VACCINE (1) 2024    PHQ-9  2024       No results found for: \"PAP\"      September 3, 2024 4:16 PM   "

## 2024-10-18 ENCOUNTER — OFFICE VISIT (OUTPATIENT)
Dept: FAMILY MEDICINE | Facility: CLINIC | Age: 84
End: 2024-10-18
Payer: MEDICARE

## 2024-10-18 ENCOUNTER — LAB REQUISITION (OUTPATIENT)
Dept: LAB | Facility: CLINIC | Age: 84
End: 2024-10-18
Payer: MEDICARE

## 2024-10-18 VITALS
OXYGEN SATURATION: 95 % | SYSTOLIC BLOOD PRESSURE: 124 MMHG | TEMPERATURE: 98.3 F | HEART RATE: 75 BPM | DIASTOLIC BLOOD PRESSURE: 70 MMHG

## 2024-10-18 DIAGNOSIS — Z12.4 ENCOUNTER FOR SCREENING FOR MALIGNANT NEOPLASM OF CERVIX: ICD-10-CM

## 2024-10-18 DIAGNOSIS — J02.9 VIRAL PHARYNGITIS: ICD-10-CM

## 2024-10-18 DIAGNOSIS — J02.9 SORE THROAT: Primary | ICD-10-CM

## 2024-10-18 LAB
FLUAV RNA SPEC QL NAA+PROBE: NEGATIVE
FLUBV RNA RESP QL NAA+PROBE: NEGATIVE
RSV RNA SPEC NAA+PROBE: NEGATIVE
SARS-COV-2 RNA RESP QL NAA+PROBE: NEGATIVE

## 2024-10-18 PROCEDURE — G0145 SCR C/V CYTO,THINLAYER,RESCR: HCPCS | Mod: ORL | Performed by: OBSTETRICS & GYNECOLOGY

## 2024-10-18 PROCEDURE — 87637 SARSCOV2&INF A&B&RSV AMP PRB: CPT | Mod: ORL | Performed by: FAMILY MEDICINE

## 2024-10-18 NOTE — PROGRESS NOTES
Assessment & Plan     Joan was seen today for uri.    Diagnoses and all orders for this visit:    Sore throat  -     Symptomatic Influenza A/B, RSV, & SARS-CoV2 PCR (COVID-19); Future    Viral pharyngitis        Discussed symptoms today most consistent with viral pharyngitis.  No indication for antibiotics at this time  Triple swab collected  Discussed option for prednisone, but given increasing anxiety recently would avoid  Can consider if symptoms not improving in 3-5 days, would recommend low dose (20 mg every day)  Patient in agreement with plan and all questions answered.    Subjective   Joan is a 84 year old, presenting for the following health issues:  URI (Cough, voice loss x 3 days -- woke on 10/16 and voice was gone. Last night runny nose, ok today. No fevers/night sweats, no meds taken.)    HPI     Acute Illness  Acute illness concerns: woke up 3 days ago, voice was gone. No cough.  Symptoms:  Fever: No  Chills/Sweats: No  Headache (location?): No  Sinus Pressure: No  Conjunctivitis:  No  Ear Pain: no  Rhinorrhea: YES- intermittent  Congestion: No  Sore Throat: No, had some discomfort on roof of mouth  Cough: no  Wheeze: No  Decreased Appetite: No  Nausea: No  Vomiting: No  Diarrhea: No  Dysuria/Freq.: No  Dysuria or Hematuria: No  Fatigue/Achiness: YES  Sick/Strep Exposure: No  Therapies tried and outcome: None        Objective    /70 (BP Location: Left arm, Patient Position: Sitting, Cuff Size: Adult Regular)   Pulse 75   Temp 98.3  F (36.8  C) (Oral)   LMP 01/01/1985   SpO2 95%   There is no height or weight on file to calculate BMI.  Physical Exam   GENERAL: alert and no distress  EYES: Eyes grossly normal to inspection, PERRL and conjunctivae and sclerae normal  HENT: hearing aids in place, nose and mouth without ulcers or lesions  NECK: no adenopathy, no asymmetry, masses, or scars  RESP: lungs clear to auscultation - no rales, rhonchi or wheezes  CV: regular rate and rhythm, normal S1  S2, no S3 or S4, no murmur, click or rub, no peripheral edema  ABDOMEN: soft, nontender, no hepatosplenomegaly, no masses and bowel sounds normal  MS: no gross musculoskeletal defects noted, no edema  SKIN: no suspicious lesions or rashes  NEURO: Normal strength and tone, mentation intact and speech normal  PSYCH: mentation appears normal, affect normal/bright        Signed Electronically by: Antonia Waldron MD

## 2024-10-18 NOTE — PATIENT INSTRUCTIONS
Continue Zyrtec and ipratropium nasal spray  Let me know if you are not feeling better by Monday

## 2024-10-18 NOTE — NURSING NOTE
84 year old  Chief Complaint   Patient presents with    URI     Cough, voice loss x 3 days -- woke on 10/16 and voice was gone. Last night runny nose, ok today. No fevers/night sweats, no meds taken.       Last menstrual period 01/01/1985. There is no height or weight on file to calculate BMI.  Patient Active Problem List   Diagnosis    History of pulmonary embolism    Concussion    Dysthymia    History of colonic polyps    Lymphedema    Migraine    Sleep apnea    Esophageal reflux    Diarrhea, unspecified type    Stage 3b chronic kidney disease (H)    Seasonal allergic rhinitis, unspecified trigger    Situational stress    Anemia    Benign paroxysmal positional vertigo    Bilateral hearing loss    Hyperlipidemia    Hypothyroidism    Insomnia    Intraductal papillary mucinous adenoma of pancreas    Macular degeneration    Melanoma in situ (H)    Monoclonal gammopathy    Murmur, heart    Osteoarthritis of knee    Osteoarthritis of cervical spine    Osteopenia    Prolonged QT interval    Skin cancer       Wt Readings from Last 2 Encounters:   09/03/24 66.7 kg (147 lb)   08/28/24 64.4 kg (142 lb)     BP Readings from Last 3 Encounters:   09/03/24 121/70   08/28/24 128/82   08/22/24 102/72         Current Outpatient Medications   Medication Sig Dispense Refill    amoxicillin (AMOXIL) 500 MG capsule Take 4 capsules by mouth as needed Take 4 capsules one hour prior to dental visit      aspirin 81 MG EC tablet Take 81 mg by mouth daily.      calcium 600 MG tablet Take 1 tablet (600 mg) by mouth 2 times daily 60 tablet     cetirizine (ZYRTEC) 10 MG tablet Take 1 tablet by mouth daily.      ipratropium (ATROVENT) 0.06 % nasal spray Spray 2 sprays into both nostrils 4 times daily      levothyroxine (SYNTHROID/LEVOTHROID) 50 MCG tablet Take one daily, RX from [place] endocrinologist      losartan-hydrochlorothiazide (HYZAAR) 50-12.5 MG tablet Take 1 tablet by mouth daily at 2 pm.      meclizine (ANTIVERT) 25 MG tablet Take 1  "tablet (25 mg) by mouth 3 times daily as needed for dizziness. 30 tablet 0    Multiple Vitamins-Minerals (PRESERVISION AREDS 2 PO) Take by mouth.      rosuvastatin (CRESTOR) 5 MG tablet Take 5mg daily, rx from [place] cardiologist      traZODone (DESYREL) 50 MG tablet Take 1 tablet (50 mg) by mouth at bedtime. Take 50mg po q hs, rx from outsider provider      venlafaxine (EFFEXOR-ER) 150 MG 24 hr tablet Take 225mg daily, RX from outside psychiatrist/provider       No current facility-administered medications for this visit.       Social History     Tobacco Use    Smoking status: Former     Current packs/day: 0.00     Types: Cigarettes     Quit date: 1982     Years since quittin.8    Smokeless tobacco: Never   Vaping Use    Vaping status: Never Used   Substance Use Topics    Alcohol use: No    Drug use: No       Health Maintenance Due   Topic Date Due    DEXA  Never done    LIPID  Never done    MICROALBUMIN  Never done    PARATHYROID  Never done    PHOSPHORUS  Never done    TSH W/FREE T4 REFLEX  Never done    ADVANCE CARE PLANNING  Never done    DEPRESSION ACTION PLAN  Never done    MEDICARE ANNUAL WELLNESS VISIT  Never done    FALL RISK ASSESSMENT  2020    PHQ-9  2024       No results found for: \"PAP\"      2024 10:08 AM    "

## 2024-10-22 ENCOUNTER — TELEPHONE (OUTPATIENT)
Dept: FAMILY MEDICINE | Facility: CLINIC | Age: 84
End: 2024-10-22

## 2024-10-22 NOTE — TELEPHONE ENCOUNTER
Left voice message on home and mobile numbers to provide Dr. Waldron's advise based on Joan's update this morning.     YOSVANY Leal, RN  10/22/24, 2:07 PM

## 2024-10-22 NOTE — TELEPHONE ENCOUNTER
This seems like likely continuation of the symptoms she was having last week, although she should retest for COVID if she hasn't already.    I'd recommend she continue to do the ipratroprium nasal spray, antihistamines and to add guafenasin (Robitussin) to help loosen up the congestion and cough it out.    If she develops fevers, shortness of breath or chest pain she should be seen.    Thanks,  Antonia Waldron MD

## 2024-10-22 NOTE — TELEPHONE ENCOUNTER
"Joan called with update from her visit on 10/18/24 with Dr. Waldron. Reports she was told she has viral laryngitis and was advised to take it easy over the weekend and call the clinic on Monday (10/21/2024) with an update.   She did not call yesterday because she thought she was feeling better, but realized today that she is not feeling better and was coughing during the phone call.   Reports \"the cough is hard but it does not keep me up at night, there is phlegm but it doesn't come up to my mouth.\"  Denies fever. Joan is not interested in prednisone, which was mentioned in visit note.    YOSVANY Leal, RN  10/22/24, 9:58 AM      "

## 2024-10-23 LAB
BKR LAB AP GYN ADEQUACY: NORMAL
BKR LAB AP GYN INTERPRETATION: NORMAL
BKR LAB AP HPV REFLEX: NO
BKR LAB AP LMP: NORMAL
BKR LAB AP PREVIOUS ABNL DX: NORMAL
BKR LAB AP PREVIOUS ABNORMAL: NORMAL
PATH REPORT.COMMENTS IMP SPEC: NORMAL
PATH REPORT.COMMENTS IMP SPEC: NORMAL
PATH REPORT.RELEVANT HX SPEC: NORMAL

## 2025-03-02 ENCOUNTER — HEALTH MAINTENANCE LETTER (OUTPATIENT)
Age: 85
End: 2025-03-02

## 2025-06-18 ENCOUNTER — TELEPHONE (OUTPATIENT)
Dept: FAMILY MEDICINE | Facility: CLINIC | Age: 85
End: 2025-06-18

## 2025-06-18 NOTE — TELEPHONE ENCOUNTER
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  I asked what Joan has tried thus far to have a bowel movement and she states she's been drinking a lot of water and has been taking 2 Colace twice a day for 4 days now.  I explained that taking a stool softener is helpful, but she also needs to have a stimulant as well to help push things along.  Recommended she add Bisacodyl 5 mg tablets and take 2 tablets (10 mg) twice daily with the Colace and if no bowel movement in the the next 2-3 days, she needs to go into the ED to make certain she isn't impacted as she has a history of this.  Joan agreed to follow this plan.  BRADLEY HernandezN, RN, [facility]  RN Care Coordinator  [facility]  06/18/25  4:38 PM  Phone: [phone]

## 2025-06-19 ENCOUNTER — TELEPHONE (OUTPATIENT)
Dept: FAMILY MEDICINE | Facility: CLINIC | Age: 85
End: 2025-06-19

## 2025-06-19 NOTE — TELEPHONE ENCOUNTER
", Cosmo, called to ask what to do for his wife Joan as she is in a \"lot\" of pain in her upper left abdomen due to not having a bowel movement in 8 days.  I asked Cosmo if Joan went to the pharmacy and got the Bisacodyl I recommended she start taking?  He said, no, she's only taking the Colace, but she did mention you recommended another agent.      I told Cosmo if she is having a lot of pain in that upper left abdominal quadrant, she needs to go to the ED NOW.  She may have diverticulitis along with being impacted.  Joan will need advanced imaging that urgent care cannot provide.  Cosmo stated he will do his best to get Joan to go to the ED.    BRADLEY HernandezN, RN, [facility]  RN Care Coordinator  [facility]  06/19/25  9:45 AM  Phone: [phone]    "

## 2025-06-23 ENCOUNTER — TELEPHONE (OUTPATIENT)
Dept: FAMILY MEDICINE | Facility: CLINIC | Age: 85
End: 2025-06-23

## 2025-06-23 NOTE — TELEPHONE ENCOUNTER
, Cosmo, called to report Joan still has not had a bowel movement since the small one she had in the ED on 6/19/25.  I asked Cosmo what all she is taking to try and have a BM and Cosmo stated she is only taking Miralax 1 capful daily.  He stated the ED provider told Joan that Colace was too mild so she stopped taking it.  Joan did say she tried a Fleets enema and it did nothing.    I instructed Cosmo to start giving Joan Miralax 17 g (1 capful) twice daily, Colace 100 mg twice daily and Dulcolax 10 mg twice daily starting NOW.  If Joan does not start having bowel movements by tomorrow afternoon, they are to call me back.  Then, I will have her do Magnesium Citrate.    BRADLEY HernandezN, RN, [facility]  RN Care Coordinator  [facility]  06/23/25  3:40 PM  Phone: [phone]

## 2025-06-24 ENCOUNTER — TELEPHONE (OUTPATIENT)
Dept: FAMILY MEDICINE | Facility: CLINIC | Age: 85
End: 2025-06-24

## 2025-06-24 NOTE — TELEPHONE ENCOUNTER
Called and spoke with , Cosmo, to check on Joan to see how her constipation is doing.  Cosmo said she had a small movement this morning, but nothing else.  She is doing the twice daily Miralax and the twice daily Colace dosing, but Joan declined the Dulcolax for fear of having diarrhea on the airplane tomorrow when they head for [place].  I explained to Cosmo that Joan has so much stool inside of her that I do not feel diarrhea will be an issue for her.  I asked him to have her at least do one dose of the Dulcolax now so she can have one solid bowel movement before they leave on their trip to offer her some relief.  Cosmo said they will do this.  Cosmo said they will resume the Dulcolax once they arrive in [place] as well where they'll be for quite some time.  I will check on Joan in the morning before they leave for the airport.  BRADLEY HernandezN, RN, CCM  RN Care Coordinator  [facility]  06/24/25  3:52 PM  Phone: [phone]

## 2025-06-25 NOTE — TELEPHONE ENCOUNTER
Call placed to , Cosmo, to check on Joan prior to their flight to [place] today at 11:30 am.  Cosmo reports Joan did not take even the one time recommended dose of the Dulcolax.  She stated she will start it after they get to [place].  Cosmo reports they will be there for 2 months.  I reinforced with Cosmo that once they arrive to their place of residence in [place], that Joan should start her Dulcolax by taking 10 mg twice daily WITH the Colace 100 mg twice daily AND the Miralax 1 capful twice daily.  Joan is on day 9 now of not having any substantial bowel movement after having bowel contrast for testing at [place].  I am concerned about the residual contrast hardening in her bowels.  Cosmo will call me in a few days with an update.  BRADLEY HernandezN, RN, [facility]  RN Care Coordinator  [facility]  06/25/25  9:49 AM  Phone: [phone]     Statement Selected

## 2025-07-07 ENCOUNTER — TELEPHONE (OUTPATIENT)
Dept: FAMILY MEDICINE | Facility: CLINIC | Age: 85
End: 2025-07-07

## 2025-07-07 NOTE — TELEPHONE ENCOUNTER
Joan called to report she went back to my original instructions of 2 Colace and 2 Bisacodyl twice daily and she has had great success as of yesterday.  Joan reports she still thinks she has more to go, but she is feeling much better.  Joan has acquired a terrible cough and I could hear her wheezing on the phone.  She is in [place] for the summer and I asked Joan to please be seen in the Urgent Care for the cough if it isn't improving the next 1-2 days.  BRADLEY HernandezN, RN, [facility]  RN Care Coordinator  [facility]  07/07/25  12:13 PM  Phone: [phone]

## 2025-07-12 ENCOUNTER — HEALTH MAINTENANCE LETTER (OUTPATIENT)
Age: 85
End: 2025-07-12

## 2025-08-25 ENCOUNTER — TELEPHONE (OUTPATIENT)
Dept: FAMILY MEDICINE | Facility: CLINIC | Age: 85
End: 2025-08-25